# Patient Record
Sex: FEMALE | Race: WHITE | NOT HISPANIC OR LATINO | Employment: OTHER | URBAN - METROPOLITAN AREA
[De-identification: names, ages, dates, MRNs, and addresses within clinical notes are randomized per-mention and may not be internally consistent; named-entity substitution may affect disease eponyms.]

---

## 2017-01-30 ENCOUNTER — AMBULATORY - HEALTHEAST (OUTPATIENT)
Dept: HEALTH INFORMATION MANAGEMENT | Facility: CLINIC | Age: 62
End: 2017-01-30

## 2024-01-12 ENCOUNTER — TRANSFERRED RECORDS (OUTPATIENT)
Dept: HEALTH INFORMATION MANAGEMENT | Facility: CLINIC | Age: 69
End: 2024-01-12
Payer: COMMERCIAL

## 2024-01-31 ENCOUNTER — MEDICAL CORRESPONDENCE (OUTPATIENT)
Dept: HEALTH INFORMATION MANAGEMENT | Facility: CLINIC | Age: 69
End: 2024-01-31
Payer: COMMERCIAL

## 2024-01-31 ENCOUNTER — TRANSCRIBE ORDERS (OUTPATIENT)
Dept: OTHER | Age: 69
End: 2024-01-31

## 2024-01-31 DIAGNOSIS — Q43.3 MALROTATION COLON (H): Primary | ICD-10-CM

## 2024-02-02 NOTE — TELEPHONE ENCOUNTER
Diagnosis, Referred by & from: malrotation of Colon   Appt date: 2/14/2024   NOTES STATUS DETAILS   OFFICE NOTE from referring provider Received [facility]:  1/12/24 - CR OV with Dr. Min   OFFICE NOTE from other specialist Care Everywhere [facility]:  1/2/24 - PCC OV with GAGAN Waddell   DISCHARGE SUMMARY from hospital Care Everywhere [facility]:  12/19/23 - Admission with Dr. Watkins   DISCHARGE REPORT from the ER N/A    OPERATIVE REPORT Care Everywhere [facility]:  5/1/23 - OP Note for ROBOTIC ASSISTED BYPASS GASTRIC AIDEE-N-Y with Dr. Waite    [facility]:  1/2/13 - OP Note for HYSTEROSCOPY with Dr. Dejesus   MEDICATION LIST Received    LABS N/A    DIAGNOSTIC PROCEDURES     COLONOSCOPY (most recent all time after 5 years) Care Everywhere [facility]:  2/13/20 - Colonoscopy   IMAGING (DISC & REPORT)      CT Received [facility]:  12/19/23 - CT Abd/pelvis   XRAY Received [facility]:  12/21/23 - XR Colon   ULTRASOUND  (ENDOANAL/ENDORECTAL) Received [facility]:  12/21/23 - US Abdomen  10/21/22 - US Abdomen     Records Requested  02/02/24    Facility  [facility]  Fax: [phone]  [facility]  Fax: [phone]   Outcome * 2/2/24 1:55 PM Faxed urgent request to [facility] and [facility] for images and records to be sent to the clinic. - Nelli    * 2/2/24 3:16 PM Records received from [facility] and sent to [facility] to be scanned into the chart. - Nelli    * 2/6/24 7:41 AM Images received from [facility] and attached to the patient in PACs. - Nelli

## 2024-02-07 NOTE — PROGRESS NOTES
Colon and Rectal Surgery Clinic Note    RE: Fany Bowman.  : 1955.  [place]RA[place]: 2024.    Reason for visit: malrotation of colon.    HPI: Fany Bowman is a 68 year old female who presents today for malrotation of colon. She has a past medical history of obesity s/p robotic joanna-en-y in May 2023, TANK on CPAP, hypercholesterolemia, type 2 diabetes (last A1C was 5.4 in 2023), and nonalcoholic fatty liver disease. During her joanna-en-y in May it was noted that her cecum was malrotated in the RUQ. Dr. Owusu did lyse adhesive bands between the colon and small bowel and performed an appendectomy. The jejunum was measured 75 cm distally and the loop of jejunum was brought up to the gastric pouch in antecolic, antegastric position. Fany was admitted to an OSH in December for abdominal pain and nausea. CT Abdomen/Pelvis on 2023 demonstrated the cecum in the LUQ anteriorly and mildly distended with gas and focal narrowing and acute angulation in the mid ascending colon. GGE on 2023 demonstrated a short segment focal narrowing in the ascending colon in the upper mid abdomen.  There was question of cecal bascule in the setting of congenital intestinal malrotation. Last colonoscopy was in  which was normal. Fany has previously had polyps.    She was seen by Dr. Min at [facility] and was referred to the [facility].     Today Fany feels well.  She has alternating constipation and diarrhea since her weight loss surgery.  She has lost 135 lbs.  Her constipation is improving off oral iron, but she will start IV iron supplementation soon.  She denies any fecal incontinence or urgency.     Colonoscopy (2020):  Findings:       Skin tags were found on perianal exam.        The digital rectal exam was normal. Pertinent negatives include        normal sphincter tone, no palpable rectal lesions and no anal lesion        or abnormality detected.        The terminal ileum appeared normal.        The  colon (entire examined portion) appeared normal.        The retroflexed view of the distal rectum and anal verge was normal        and showed no anal or rectal abnormalities.   Moderate Sedation:        Moderate (conscious) sedation was administered by the endoscopy nurse        and supervised by the endoscopist. The following parameters were        monitored: oxygen saturation, heart rate, blood pressure, and        response to care. Total physician intraservice time was 18 minutes.   Impression:          - Perianal skin tags found on perianal exam.                        - The examined portion of the ileum was normal.                        - The entire examined colon is normal.                        - No specimens collected.     CT Abdomen/Pelvis (12/19/2023):  1.  Several diminutive calcified stones in the mildly distended gallbladder.   2.  Normal variant congenital intestinal malrotation.   3.  The cecum is located in the left upper quadrant anteriorly, is mildly distended with gas and there is focal narrowing and acute angulation in the mid ascending colon all of which is of uncertain clinical significance.   4.  Appendectomy.   5.  Unremarkable Manan-en-Y gastric bypass     X Ray Water Colon Soluble (12/21/2023):  1.  Short segment focal narrowing in the ascending colon in the upper mid abdomen, as seen on prior CT. No apple core mass lesion was identified on prior CT. I was able to get contrast to pass through this area, and there is no complete obstruction. This may be a cecal bascule in the setting of congenital intestinal nonrotation/malrotation. The differential includes a high-grade stricture due to prior ischemia, but considered less likely given the twisting appearance on prior CT.     Medical history:  Morbid obesity   2. Obstructive sleep apnea   3. Hypercholesterolemia   4. Type 2 diabetes mellitus   5. NAFLD (nonalcoholic fatty liver disease)     Surgical history:  Total right knee arthroplasty    Manan-en-y with hiatal hernia repair and incidental appendectomy     Family history:  No family history on file.  No IBD or GI malignancy    Medications:  Current Outpatient Medications   Medication Sig Dispense Refill    atorvastatin (LIPITOR) 10 MG tablet [ATORVASTATIN (LIPITOR) 10 MG TABLET] Take 10 mg by mouth bedtime.      glucosamine-chondroitin 500-400 mg cap [GLUCOSAMINE-CHONDROITIN 500-400 MG CAP] Take 2 capsules by mouth daily.      PARoxetine (PAXIL) 20 MG tablet [PAROXETINE (PAXIL) 20 MG TABLET] Take 20 mg by mouth bedtime.          Allergies:  No Known Allergies    Social history:   Social History     Tobacco Use    Smoking status: Never    Smokeless tobacco: Not on file   Substance Use Topics    Alcohol use: Yes     Comment: Alcoholic Drinks/day: 1 wk     Marital status: .    ROS:  A complete review of systems was performed with the patient and all systems negative except as per HPI.    Physical Examination:  Exam was chaperoned by GAGAN Bai   There were no vitals taken for this visit.  General: Well hydrated. No acute distress.  CV: RRR  Lung: Non-labored breathing on RA  Abdomen: Soft, NT, nondistended. No hernias    ASSESSMENT/PLAN:    This is a 68 year old with partial congenital malrotation s/p bands lysed at time of gastric bypass, now with episode of cecal bascule.  We reviewed the roles of right colectomy and cecopexy.  If possible, I think the latter would be a good option to prevent any worsening of diarrhea.  We discussed this could have a higher rate of reoccurrence.  We reviewed also what a right colectomy would involve in terms of procedure, recovery, risks and benefits, including a 2-3% risk of anastomotic leak and how that would be handled.    Recommending Robotic cecopexy vs right colectomy.    Risks, benefits, and alternatives of operative treatment were thoroughly discussed with the patient, she understands these well and agrees to proceed.    All pertinent labs  and imaging were personally reviewed by me.    30 minutes spent on the date of the encounter doing chart review, history and exam, imaging review, documentation and further activities as noted above.    The Division of Colon and Rectal Surgery at [facility] is committed to advancing discovery and innovation in human health through research. Fany LEONILA Bowman is willing to be contacted by our research team if they qualify for any future research studies:  N/A    Nelli Rubin MD    Division of Colon and Rectal Surgery  [facility]    Referring Provider:  No referring provider defined for this encounter.     Primary Care Provider:  No Ref-Primary, Physician

## 2024-02-14 ENCOUNTER — OFFICE VISIT (OUTPATIENT)
Dept: SURGERY | Facility: CLINIC | Age: 69
End: 2024-02-14
Payer: COMMERCIAL

## 2024-02-14 ENCOUNTER — PRE VISIT (OUTPATIENT)
Dept: SURGERY | Facility: CLINIC | Age: 69
End: 2024-02-14

## 2024-02-14 ENCOUNTER — TELEPHONE (OUTPATIENT)
Dept: SURGERY | Facility: CLINIC | Age: 69
End: 2024-02-14

## 2024-02-14 VITALS
DIASTOLIC BLOOD PRESSURE: 70 MMHG | WEIGHT: 155.1 LBS | HEART RATE: 66 BPM | OXYGEN SATURATION: 98 % | HEIGHT: 67 IN | BODY MASS INDEX: 24.34 KG/M2 | SYSTOLIC BLOOD PRESSURE: 110 MMHG

## 2024-02-14 DIAGNOSIS — K56.2 CECAL BASCULE (H): Primary | ICD-10-CM

## 2024-02-14 DIAGNOSIS — Q43.3 MALROTATION OF COLON (H): ICD-10-CM

## 2024-02-14 PROCEDURE — 99203 OFFICE O/P NEW LOW 30 MIN: CPT | Performed by: COLON & RECTAL SURGERY

## 2024-02-14 RX ORDER — MAGNESIUM CARB/ALUMINUM HYDROX 105-160MG
TABLET,CHEWABLE ORAL
Qty: 296 ML | Refills: 0 | Status: ON HOLD | OUTPATIENT
Start: 2024-02-14 | End: 2024-02-19

## 2024-02-14 RX ORDER — ACETAMINOPHEN 500 MG
1000 TABLET ORAL
Status: ON HOLD | COMMUNITY
End: 2024-02-21

## 2024-02-14 RX ORDER — FERROUS SULFATE 325(65) MG
325 TABLET ORAL
COMMUNITY

## 2024-02-14 RX ORDER — DOCUSATE SODIUM 100 MG/1
100 CAPSULE, LIQUID FILLED ORAL 2 TIMES DAILY
Status: ON HOLD | COMMUNITY
End: 2024-02-21

## 2024-02-14 RX ORDER — NEOMYCIN SULFATE 500 MG/1
1000 TABLET ORAL EVERY 6 HOURS
Qty: 6 TABLET | Refills: 0 | Status: ON HOLD | OUTPATIENT
Start: 2024-02-14 | End: 2024-02-19

## 2024-02-14 RX ORDER — METRONIDAZOLE 500 MG/1
500 TABLET ORAL EVERY 6 HOURS
Qty: 3 TABLET | Refills: 0 | Status: ON HOLD | OUTPATIENT
Start: 2024-02-14 | End: 2024-02-19

## 2024-02-14 RX ORDER — NEOMYCIN SULFATE, POLYMYXIN B SULFATE AND DEXAMETHASONE 3.5; 10000; 1 MG/ML; [USP'U]/ML; MG/ML
1-2 SUSPENSION/ DROPS OPHTHALMIC EVERY 4 HOURS
COMMUNITY

## 2024-02-14 RX ORDER — ONDANSETRON 4 MG/1
4 TABLET, FILM COATED ORAL EVERY 6 HOURS
Qty: 3 TABLET | Refills: 0 | Status: ON HOLD | OUTPATIENT
Start: 2024-02-14 | End: 2024-02-21

## 2024-02-14 RX ORDER — POLYETHYLENE GLYCOL 3350 17 G/17G
POWDER, FOR SOLUTION ORAL
Qty: 238 G | Refills: 0 | Status: ON HOLD | OUTPATIENT
Start: 2024-02-14 | End: 2024-02-21

## 2024-02-14 ASSESSMENT — PAIN SCALES - GENERAL: PAINLEVEL: NO PAIN (0)

## 2024-02-14 NOTE — NURSING NOTE
"Chief Complaint   Patient presents with    Consult     Malrotation of colon.       Vitals:    02/14/24 0842   BP: 110/70   BP Location: Left arm   Patient Position: Sitting   Cuff Size: Adult Regular   Pulse: 66   SpO2: 98%   Weight: 70.4 kg (155 lb 1.6 oz)   Height: 1.702 m (5' 7\")       Body mass index is 24.29 kg/m .  Daniel Bush, EMT    "

## 2024-02-14 NOTE — TELEPHONE ENCOUNTER
Vm msg received from patient wanting a call back to schedule surgery with Dr. Rubin.    Forwarded patient's request to Dr. Rubin's  Supervisor Lucie Yeager to follow up with patient.    Laurita Godinez  [place]-op Coordinator  [facility]  Direct Phone: [phone]

## 2024-02-14 NOTE — LETTER
2024       RE: Fany Bowman  [address] [facility] [facility] [address]     Dear Colleague,    Thank you for referring your patient, Fany Bowman, to the [facility] COLON AND RECTAL [facility]GERY [facility] [place] [facility]. Please see a copy of my visit note below.    Colon and Rectal Surgery Clinic Note    RE: Fany Bowman.  : 1955.  [place]RA[place]: 2024.    Reason for visit: malrotation of colon.    HPI: Fany Bowman is a 68 year old female who presents today for malrotation of colon. She has a past medical history of obesity s/p robotic joanna-en-y in May 2023, TANK on CPAP, hypercholesterolemia, type 2 diabetes (last A1C was 5.4 in 2023), and nonalcoholic fatty liver disease. During her joanna-en-y in May it was noted that her cecum was malrotated in the RUQ. Dr. Owusu did lyse adhesive bands between the colon and small bowel and performed an appendectomy. The jejunum was measured 75 cm distally and the loop of jejunum was brought up to the gastric pouch in antecolic, antegastric position. Fany was admitted to an OSH in December for abdominal pain and nausea. CT Abdomen/Pelvis on 2023 demonstrated the cecum in the LUQ anteriorly and mildly distended with gas and focal narrowing and acute angulation in the mid ascending colon. GGE on 2023 demonstrated a short segment focal narrowing in the ascending colon in the upper mid abdomen.  There was question of cecal bascule in the setting of congenital intestinal malrotation. Last colonoscopy was in  which was normal. Fany has previously had polyps.    She was seen by Dr. Min at [facility] and was referred to the [facility].     Today Fany feels well.  She has alternating constipation and diarrhea since her weight loss surgery.  She has lost 135 lbs.  Her constipation is improving off oral iron, but she will start IV iron supplementation soon.  She denies any fecal incontinence or urgency.      Colonoscopy (2/13/2020):  Findings:       Skin tags were found on perianal exam.        The digital rectal exam was normal. Pertinent negatives include        normal sphincter tone, no palpable rectal lesions and no anal lesion        or abnormality detected.        The terminal ileum appeared normal.        The colon (entire examined portion) appeared normal.        The retroflexed view of the distal rectum and anal verge was normal        and showed no anal or rectal abnormalities.   Moderate Sedation:        Moderate (conscious) sedation was administered by the endoscopy nurse        and supervised by the endoscopist. The following parameters were        monitored: oxygen saturation, heart rate, blood pressure, and        response to care. Total physician intraservice time was 18 minutes.   Impression:          - Perianal skin tags found on perianal exam.                        - The examined portion of the ileum was normal.                        - The entire examined colon is normal.                        - No specimens collected.     CT Abdomen/Pelvis (12/19/2023):  1.  Several diminutive calcified stones in the mildly distended gallbladder.   2.  Normal variant congenital intestinal malrotation.   3.  The cecum is located in the left upper quadrant anteriorly, is mildly distended with gas and there is focal narrowing and acute angulation in the mid ascending colon all of which is of uncertain clinical significance.   4.  Appendectomy.   5.  Unremarkable Manan-en-Y gastric bypass     X Ray Water Colon Soluble (12/21/2023):  1.  Short segment focal narrowing in the ascending colon in the upper mid abdomen, as seen on prior CT. No apple core mass lesion was identified on prior CT. I was able to get contrast to pass through this area, and there is no complete obstruction. This may be a cecal bascule in the setting of congenital intestinal nonrotation/malrotation. The differential includes a high-grade  stricture due to prior ischemia, but considered less likely given the twisting appearance on prior CT.     Medical history:  Morbid obesity   2. Obstructive sleep apnea   3. Hypercholesterolemia   4. Type 2 diabetes mellitus   5. NAFLD (nonalcoholic fatty liver disease)     Surgical history:  Total right knee arthroplasty   Manan-en-y with hiatal hernia repair and incidental appendectomy     Family history:  No family history on file.  No IBD or GI malignancy    Medications:  Current Outpatient Medications   Medication Sig Dispense Refill    atorvastatin (LIPITOR) 10 MG tablet [ATORVASTATIN (LIPITOR) 10 MG TABLET] Take 10 mg by mouth bedtime.      glucosamine-chondroitin 500-400 mg cap [GLUCOSAMINE-CHONDROITIN 500-400 MG CAP] Take 2 capsules by mouth daily.      PARoxetine (PAXIL) 20 MG tablet [PAROXETINE (PAXIL) 20 MG TABLET] Take 20 mg by mouth bedtime.          Allergies:  No Known Allergies    Social history:   Social History     Tobacco Use    Smoking status: Never    Smokeless tobacco: Not on file   Substance Use Topics    Alcohol use: Yes     Comment: Alcoholic Drinks/day: 1 wk     Marital status: .    ROS:  A complete review of systems was performed with the patient and all systems negative except as per HPI.    Physical Examination:  Exam was chaperoned by GAGAN Bai   There were no vitals taken for this visit.  General: Well hydrated. No acute distress.  CV: RRR  Lung: Non-labored breathing on RA  Abdomen: Soft, NT, nondistended. No hernias    ASSESSMENT/PLAN:    This is a 68 year old with partial congenital malrotation s/p bands lysed at time of gastric bypass, now with episode of cecal bascule.  We reviewed the roles of right colectomy and cecopexy.  If possible, I think the latter would be a good option to prevent any worsening of diarrhea.  We discussed this could have a higher rate of reoccurrence.  We reviewed also what a right colectomy would involve in terms of procedure,  recovery, risks and benefits, including a 2-3% risk of anastomotic leak and how that would be handled.    Recommending Robotic cecopexy vs right colectomy.    Risks, benefits, and alternatives of operative treatment were thoroughly discussed with the patient, she understands these well and agrees to proceed.    All pertinent labs and imaging were personally reviewed by me.    30 minutes spent on the date of the encounter doing chart review, history and exam, imaging review, documentation and further activities as noted above.    The Division of Colon and Rectal Surgery at [facility] is committed to advancing discovery and innovation in human health through research. Fany LEONILA Bowman is willing to be contacted by our research team if they qualify for any future research studies:  N/A      Referring Provider:  No referring provider defined for this encounter.     Primary Care Provider:  No Ref-Primary, Physician      Again, thank you for allowing me to participate in the care of your patient.      Sincerely,    Nelli Rubin MD

## 2024-02-15 ENCOUNTER — HOSPITAL ENCOUNTER (INPATIENT)
Facility: CLINIC | Age: 69
Setting detail: SURGERY ADMIT
End: 2024-02-15
Attending: COLON & RECTAL SURGERY | Admitting: COLON & RECTAL SURGERY
Payer: COMMERCIAL

## 2024-02-15 NOTE — TELEPHONE ENCOUNTER
Called patient to schedule surgery with Dr. Rubin.   Offered patient next available of 3/18/2024.   Patient agreed to surgical date.     Location of surgery: [place] OR  Pre-Op H&P: PAC in person scheduled 3/5/2024 with labs to follow     Post-Op Appt Date: 2-3 weeks with PA and surgeon 3-4 after. Patient aware this will be mailed out to her with surgical packet     Patient aware that pre-op RN will call 2-3 days prior to surgery with arrival time and instructions --  Yes    Packet sent out: Yes + full prep sent in surgical packet 02/15/24    Additional Comments:All patients questions were answered and was instructed to review surgical packet and call back with any questions or concerns.       Lucie Yeager on 2/15/2024 at 2:39 PM

## 2024-02-16 NOTE — TELEPHONE ENCOUNTER
FUTURE VISIT INFORMATION      SURGERY INFORMATION:  Date: 3/18/24  Location:  or  Surgeon:  Nelli Rubin MD   Anesthesia Type:  general  Procedure: Robotic cecopexy versus right colectomy   Consult: ov 2/14/24    RECORDS REQUESTED FROM:       Primary Care Provider: Maribel Gonzalez MD  - [facility]    Pertinent Medical History: bradycardia

## 2024-02-18 ENCOUNTER — ANESTHESIA (OUTPATIENT)
Dept: SURGERY | Facility: CLINIC | Age: 69
DRG: 329 | End: 2024-02-18
Payer: COMMERCIAL

## 2024-02-18 ENCOUNTER — ANESTHESIA EVENT (OUTPATIENT)
Dept: SURGERY | Facility: CLINIC | Age: 69
DRG: 329 | End: 2024-02-18
Payer: COMMERCIAL

## 2024-02-18 ENCOUNTER — HOSPITAL ENCOUNTER (INPATIENT)
Facility: CLINIC | Age: 69
LOS: 3 days | Discharge: HOME | DRG: 329 | End: 2024-02-21
Attending: INTERNAL MEDICINE | Admitting: COLON & RECTAL SURGERY
Payer: COMMERCIAL

## 2024-02-18 ENCOUNTER — APPOINTMENT (OUTPATIENT)
Dept: CT IMAGING | Facility: CLINIC | Age: 69
DRG: 329 | End: 2024-02-18
Attending: INTERNAL MEDICINE
Payer: COMMERCIAL

## 2024-02-18 DIAGNOSIS — K55.9 ISCHEMIC BOWEL DISEASE (H): Primary | ICD-10-CM

## 2024-02-18 DIAGNOSIS — G89.18 ACUTE POST-OPERATIVE PAIN: ICD-10-CM

## 2024-02-18 DIAGNOSIS — K56.2 CECAL BASCULE (H): ICD-10-CM

## 2024-02-18 DIAGNOSIS — Q43.3: ICD-10-CM

## 2024-02-18 DIAGNOSIS — K56.2 CECAL VOLVULUS (H): ICD-10-CM

## 2024-02-18 DIAGNOSIS — Q43.3 MALROTATION OF COLON (H): ICD-10-CM

## 2024-02-18 LAB
ABO/RH(D): NORMAL
ALBUMIN SERPL BCG-MCNC: 4.4 G/DL (ref 3.5–5.2)
ALP SERPL-CCNC: 55 U/L (ref 40–150)
ALT SERPL W P-5'-P-CCNC: 21 U/L (ref 0–50)
ANION GAP SERPL CALCULATED.3IONS-SCNC: 27 MMOL/L (ref 7–15)
ANTIBODY SCREEN: NEGATIVE
AST SERPL W P-5'-P-CCNC: 32 U/L (ref 0–45)
ATRIAL RATE - MUSE: 45 BPM
BASE EXCESS BLDA CALC-SCNC: 0 MMOL/L (ref -3–3)
BASE EXCESS BLDA CALC-SCNC: 2.1 MMOL/L (ref -3–3)
BASOPHILS # BLD AUTO: 0.1 10E3/UL (ref 0–0.2)
BASOPHILS NFR BLD AUTO: 1 %
BILIRUB SERPL-MCNC: 0.6 MG/DL
BUN SERPL-MCNC: 14 MG/DL (ref 8–23)
CA-I BLD-MCNC: 4.4 MG/DL (ref 4.4–5.2)
CA-I BLD-MCNC: 5 MG/DL (ref 4.4–5.2)
CALCIUM SERPL-MCNC: 9.8 MG/DL (ref 8.8–10.2)
CHLORIDE SERPL-SCNC: 102 MMOL/L (ref 98–107)
CREAT SERPL-MCNC: 0.67 MG/DL (ref 0.51–0.95)
CREAT SERPL-MCNC: 0.8 MG/DL (ref 0.51–0.95)
CRP SERPL-MCNC: <3 MG/L
DEPRECATED HCO3 PLAS-SCNC: 15 MMOL/L (ref 22–29)
DIASTOLIC BLOOD PRESSURE - MUSE: NORMAL MMHG
EGFRCR SERPLBLD CKD-EPI 2021: 80 ML/MIN/1.73M2
EGFRCR SERPLBLD CKD-EPI 2021: >90 ML/MIN/1.73M2
EOSINOPHIL # BLD AUTO: 0.2 10E3/UL (ref 0–0.7)
EOSINOPHIL NFR BLD AUTO: 4 %
ERYTHROCYTE [DISTWIDTH] IN BLOOD BY AUTOMATED COUNT: 13.7 % (ref 10–15)
ERYTHROCYTE [SEDIMENTATION RATE] IN BLOOD BY WESTERGREN METHOD: 14 MM/HR (ref 0–30)
GLUCOSE BLD-MCNC: 166 MG/DL (ref 70–99)
GLUCOSE BLD-MCNC: 206 MG/DL (ref 70–99)
GLUCOSE BLDC GLUCOMTR-MCNC: 131 MG/DL (ref 70–99)
GLUCOSE SERPL-MCNC: 208 MG/DL (ref 70–99)
HCO3 BLDA-SCNC: 25 MMOL/L (ref 21–28)
HCO3 BLDA-SCNC: 26 MMOL/L (ref 21–28)
HCO3 BLDV-SCNC: 16 MMOL/L (ref 21–28)
HCT VFR BLD AUTO: 35 % (ref 35–47)
HGB BLD-MCNC: 11.3 G/DL (ref 11.7–15.7)
HGB BLD-MCNC: 11.5 G/DL (ref 11.7–15.7)
HGB BLD-MCNC: 11.8 G/DL (ref 11.7–15.7)
HOLD SPECIMEN: NORMAL
IMM GRANULOCYTES # BLD: 0 10E3/UL
IMM GRANULOCYTES NFR BLD: 0 %
INR PPP: 1.07 (ref 0.85–1.15)
INTERPRETATION ECG - MUSE: NORMAL
LACTATE BLD-SCNC: 0.7 MMOL/L (ref 0.7–2)
LACTATE BLD-SCNC: 1 MMOL/L (ref 0.7–2)
LACTATE BLD-SCNC: 7.4 MMOL/L
LACTATE SERPL-SCNC: 7.1 MMOL/L (ref 0.7–2)
LYMPHOCYTES # BLD AUTO: 2.9 10E3/UL (ref 0.8–5.3)
LYMPHOCYTES NFR BLD AUTO: 51 %
MCH RBC QN AUTO: 31.4 PG (ref 26.5–33)
MCHC RBC AUTO-ENTMCNC: 33.7 G/DL (ref 31.5–36.5)
MCV RBC AUTO: 93 FL (ref 78–100)
MONOCYTES # BLD AUTO: 0.5 10E3/UL (ref 0–1.3)
MONOCYTES NFR BLD AUTO: 8 %
NEUTROPHILS # BLD AUTO: 2 10E3/UL (ref 1.6–8.3)
NEUTROPHILS NFR BLD AUTO: 36 %
NRBC # BLD AUTO: 0 10E3/UL
NRBC BLD AUTO-RTO: 0 /100
O2/TOTAL GAS SETTING VFR VENT: 53 %
O2/TOTAL GAS SETTING VFR VENT: 53 %
OXYHGB MFR BLDA: 98 % (ref 92–100)
OXYHGB MFR BLDA: 98 % (ref 92–100)
P AXIS - MUSE: 44 DEGREES
PCO2 BLDA: 38 MM HG (ref 35–45)
PCO2 BLDA: 39 MM HG (ref 35–45)
PCO2 BLDV: 13 MM HG (ref 40–50)
PH BLDA: 7.41 [PH] (ref 7.35–7.45)
PH BLDA: 7.44 [PH] (ref 7.35–7.45)
PH BLDV: 7.7 [PH] (ref 7.32–7.43)
PLATELET # BLD AUTO: 276 10E3/UL (ref 150–450)
PO2 BLDA: 290 MM HG (ref 80–105)
PO2 BLDA: 290 MM HG (ref 80–105)
PO2 BLDV: 22 MM HG (ref 25–47)
POTASSIUM BLD-SCNC: 3.2 MMOL/L (ref 3.4–5.3)
POTASSIUM BLD-SCNC: 3.5 MMOL/L (ref 3.4–5.3)
POTASSIUM SERPL-SCNC: 3.2 MMOL/L (ref 3.4–5.3)
PR INTERVAL - MUSE: 108 MS
PROCALCITONIN SERPL IA-MCNC: 0.03 NG/ML
PROT SERPL-MCNC: 6.7 G/DL (ref 6.4–8.3)
QRS DURATION - MUSE: 84 MS
QT - MUSE: 564 MS
QTC - MUSE: 487 MS
R AXIS - MUSE: -32 DEGREES
RBC # BLD AUTO: 3.76 10E6/UL (ref 3.8–5.2)
SAO2 % BLDA: 98 % (ref 95–96)
SAO2 % BLDA: 98 % (ref 95–96)
SAO2 % BLDV: 61 % (ref 70–75)
SODIUM BLD-SCNC: 142 MMOL/L (ref 135–145)
SODIUM BLD-SCNC: 143 MMOL/L (ref 135–145)
SODIUM SERPL-SCNC: 144 MMOL/L (ref 135–145)
SPECIMEN EXPIRATION DATE: NORMAL
SYSTOLIC BLOOD PRESSURE - MUSE: NORMAL MMHG
T AXIS - MUSE: 37 DEGREES
VENTRICULAR RATE- MUSE: 45 BPM
WBC # BLD AUTO: 5.8 10E3/UL (ref 4–11)

## 2024-02-18 PROCEDURE — 36415 COLL VENOUS BLD VENIPUNCTURE: CPT | Performed by: INTERNAL MEDICINE

## 2024-02-18 PROCEDURE — 49000 EXPLORATION OF ABDOMEN: CPT

## 2024-02-18 PROCEDURE — 96374 THER/PROPH/DIAG INJ IV PUSH: CPT | Mod: 59 | Performed by: INTERNAL MEDICINE

## 2024-02-18 PROCEDURE — 84145 PROCALCITONIN (PCT): CPT | Performed by: INTERNAL MEDICINE

## 2024-02-18 PROCEDURE — 36415 COLL VENOUS BLD VENIPUNCTURE: CPT

## 2024-02-18 PROCEDURE — 250N000009 HC RX 250: Performed by: ANESTHESIOLOGY

## 2024-02-18 PROCEDURE — 999N000141 HC STATISTIC PRE-PROCEDURE NURSING ASSESSMENT: Performed by: COLON & RECTAL SURGERY

## 2024-02-18 PROCEDURE — 96361 HYDRATE IV INFUSION ADD-ON: CPT | Performed by: INTERNAL MEDICINE

## 2024-02-18 PROCEDURE — 85025 COMPLETE CBC W/AUTO DIFF WBC: CPT | Performed by: INTERNAL MEDICINE

## 2024-02-18 PROCEDURE — 96376 TX/PRO/DX INJ SAME DRUG ADON: CPT | Performed by: INTERNAL MEDICINE

## 2024-02-18 PROCEDURE — 83605 ASSAY OF LACTIC ACID: CPT

## 2024-02-18 PROCEDURE — 99140 ANES COMP EMERGENCY COND: CPT | Performed by: ANESTHESIOLOGY

## 2024-02-18 PROCEDURE — 258N000003 HC RX IP 258 OP 636: Performed by: INTERNAL MEDICINE

## 2024-02-18 PROCEDURE — 96375 TX/PRO/DX INJ NEW DRUG ADDON: CPT | Performed by: INTERNAL MEDICINE

## 2024-02-18 PROCEDURE — 85610 PROTHROMBIN TIME: CPT | Performed by: INTERNAL MEDICINE

## 2024-02-18 PROCEDURE — 88309 TISSUE EXAM BY PATHOLOGIST: CPT | Mod: TC | Performed by: COLON & RECTAL SURGERY

## 2024-02-18 PROCEDURE — 250N000013 HC RX MED GY IP 250 OP 250 PS 637

## 2024-02-18 PROCEDURE — 93005 ELECTROCARDIOGRAM TRACING: CPT | Performed by: INTERNAL MEDICINE

## 2024-02-18 PROCEDURE — 82040 ASSAY OF SERUM ALBUMIN: CPT | Performed by: INTERNAL MEDICINE

## 2024-02-18 PROCEDURE — 250N000011 HC RX IP 250 OP 636: Performed by: INTERNAL MEDICINE

## 2024-02-18 PROCEDURE — 5A09357 ASSISTANCE WITH RESPIRATORY VENTILATION, LESS THAN 24 CONSECUTIVE HOURS, CONTINUOUS POSITIVE AIRWAY PRESSURE: ICD-10-PCS | Performed by: COLON & RECTAL SURGERY

## 2024-02-18 PROCEDURE — 86140 C-REACTIVE PROTEIN: CPT | Performed by: INTERNAL MEDICINE

## 2024-02-18 PROCEDURE — 0DTF0ZZ RESECTION OF RIGHT LARGE INTESTINE, OPEN APPROACH: ICD-10-PCS | Performed by: COLON & RECTAL SURGERY

## 2024-02-18 PROCEDURE — 74177 CT ABD & PELVIS W/CONTRAST: CPT

## 2024-02-18 PROCEDURE — 250N000025 HC SEVOFLURANE, PER MIN: Performed by: COLON & RECTAL SURGERY

## 2024-02-18 PROCEDURE — 44160 REMOVAL OF COLON: CPT | Mod: 22 | Performed by: COLON & RECTAL SURGERY

## 2024-02-18 PROCEDURE — 82565 ASSAY OF CREATININE: CPT

## 2024-02-18 PROCEDURE — 120N000002 HC R&B MED SURG/OB UMMC

## 2024-02-18 PROCEDURE — 250N000011 HC RX IP 250 OP 636: Performed by: ANESTHESIOLOGY

## 2024-02-18 PROCEDURE — 99140 ANES COMP EMERGENCY COND: CPT

## 2024-02-18 PROCEDURE — 82805 BLOOD GASES W/O2 SATURATION: CPT

## 2024-02-18 PROCEDURE — 49000 EXPLORATION OF ABDOMEN: CPT | Performed by: ANESTHESIOLOGY

## 2024-02-18 PROCEDURE — 86900 BLOOD TYPING SEROLOGIC ABO: CPT | Performed by: INTERNAL MEDICINE

## 2024-02-18 PROCEDURE — 360N000077 HC SURGERY LEVEL 4, PER MIN: Performed by: COLON & RECTAL SURGERY

## 2024-02-18 PROCEDURE — 85652 RBC SED RATE AUTOMATED: CPT | Performed by: INTERNAL MEDICINE

## 2024-02-18 PROCEDURE — 258N000003 HC RX IP 258 OP 636: Performed by: ANESTHESIOLOGY

## 2024-02-18 PROCEDURE — 370N000017 HC ANESTHESIA TECHNICAL FEE, PER MIN: Performed by: COLON & RECTAL SURGERY

## 2024-02-18 PROCEDURE — 99285 EMERGENCY DEPT VISIT HI MDM: CPT | Mod: 25 | Performed by: INTERNAL MEDICINE

## 2024-02-18 PROCEDURE — 258N000003 HC RX IP 258 OP 636

## 2024-02-18 PROCEDURE — 93010 ELECTROCARDIOGRAM REPORT: CPT | Performed by: INTERNAL MEDICINE

## 2024-02-18 PROCEDURE — 710N000010 HC RECOVERY PHASE 1, LEVEL 2, PER MIN: Performed by: COLON & RECTAL SURGERY

## 2024-02-18 PROCEDURE — 250N000011 HC RX IP 250 OP 636: Mod: JZ

## 2024-02-18 PROCEDURE — 88309 TISSUE EXAM BY PATHOLOGIST: CPT | Mod: 26 | Performed by: PATHOLOGY

## 2024-02-18 PROCEDURE — 99291 CRITICAL CARE FIRST HOUR: CPT | Mod: 25 | Performed by: INTERNAL MEDICINE

## 2024-02-18 PROCEDURE — 272N000001 HC OR GENERAL SUPPLY STERILE: Performed by: COLON & RECTAL SURGERY

## 2024-02-18 PROCEDURE — 250N000011 HC RX IP 250 OP 636

## 2024-02-18 PROCEDURE — 0DQV0ZZ REPAIR MESENTERY, OPEN APPROACH: ICD-10-PCS | Performed by: COLON & RECTAL SURGERY

## 2024-02-18 PROCEDURE — 74177 CT ABD & PELVIS W/CONTRAST: CPT | Mod: 26 | Performed by: RADIOLOGY

## 2024-02-18 PROCEDURE — 82330 ASSAY OF CALCIUM: CPT

## 2024-02-18 PROCEDURE — 82803 BLOOD GASES ANY COMBINATION: CPT

## 2024-02-18 RX ORDER — CEFAZOLIN SODIUM/WATER 2 G/20 ML
2 SYRINGE (ML) INTRAVENOUS
Status: COMPLETED | OUTPATIENT
Start: 2024-02-18 | End: 2024-02-18

## 2024-02-18 RX ORDER — LIDOCAINE HYDROCHLORIDE 20 MG/ML
INJECTION, SOLUTION INFILTRATION; PERINEURAL PRN
Status: DISCONTINUED | OUTPATIENT
Start: 2024-02-18 | End: 2024-02-18

## 2024-02-18 RX ORDER — MAGNESIUM SULFATE HEPTAHYDRATE 40 MG/ML
2 INJECTION, SOLUTION INTRAVENOUS ONCE
Status: COMPLETED | OUTPATIENT
Start: 2024-02-18 | End: 2024-02-18

## 2024-02-18 RX ORDER — SODIUM CHLORIDE, SODIUM LACTATE, POTASSIUM CHLORIDE, CALCIUM CHLORIDE 600; 310; 30; 20 MG/100ML; MG/100ML; MG/100ML; MG/100ML
INJECTION, SOLUTION INTRAVENOUS CONTINUOUS PRN
Status: DISCONTINUED | OUTPATIENT
Start: 2024-02-18 | End: 2024-02-18

## 2024-02-18 RX ORDER — LIDOCAINE 40 MG/G
CREAM TOPICAL
Status: DISCONTINUED | OUTPATIENT
Start: 2024-02-18 | End: 2024-02-21 | Stop reason: HOSPADM

## 2024-02-18 RX ORDER — METRONIDAZOLE 500 MG/100ML
500 INJECTION, SOLUTION INTRAVENOUS
Status: COMPLETED | OUTPATIENT
Start: 2024-02-18 | End: 2024-02-18

## 2024-02-18 RX ORDER — DEXAMETHASONE SODIUM PHOSPHATE 4 MG/ML
INJECTION, SOLUTION INTRA-ARTICULAR; INTRALESIONAL; INTRAMUSCULAR; INTRAVENOUS; SOFT TISSUE PRN
Status: DISCONTINUED | OUTPATIENT
Start: 2024-02-18 | End: 2024-02-18

## 2024-02-18 RX ORDER — POTASSIUM CHLORIDE 7.45 MG/ML
10 INJECTION INTRAVENOUS ONCE
Status: DISCONTINUED | OUTPATIENT
Start: 2024-02-18 | End: 2024-02-18 | Stop reason: HOSPADM

## 2024-02-18 RX ORDER — DEXTROSE MONOHYDRATE 25 G/50ML
25-50 INJECTION, SOLUTION INTRAVENOUS
Status: DISCONTINUED | OUTPATIENT
Start: 2024-02-18 | End: 2024-02-18 | Stop reason: HOSPADM

## 2024-02-18 RX ORDER — HYDROMORPHONE HYDROCHLORIDE 1 MG/ML
0.5 INJECTION, SOLUTION INTRAMUSCULAR; INTRAVENOUS; SUBCUTANEOUS ONCE
Status: COMPLETED | OUTPATIENT
Start: 2024-02-18 | End: 2024-02-18

## 2024-02-18 RX ORDER — HALOPERIDOL 5 MG/ML
1 INJECTION INTRAMUSCULAR
Status: DISCONTINUED | OUTPATIENT
Start: 2024-02-18 | End: 2024-02-18 | Stop reason: HOSPADM

## 2024-02-18 RX ORDER — ACETAMINOPHEN 325 MG/1
975 TABLET ORAL EVERY 8 HOURS
Status: DISCONTINUED | OUTPATIENT
Start: 2024-02-18 | End: 2024-02-21 | Stop reason: HOSPADM

## 2024-02-18 RX ORDER — ONDANSETRON 4 MG/1
4 TABLET, ORALLY DISINTEGRATING ORAL EVERY 6 HOURS PRN
Status: DISCONTINUED | OUTPATIENT
Start: 2024-02-18 | End: 2024-02-21 | Stop reason: HOSPADM

## 2024-02-18 RX ORDER — ACETAMINOPHEN 325 MG/1
650 TABLET ORAL EVERY 4 HOURS PRN
Status: DISCONTINUED | OUTPATIENT
Start: 2024-02-21 | End: 2024-02-21 | Stop reason: HOSPADM

## 2024-02-18 RX ORDER — PROPOFOL 10 MG/ML
INJECTION, EMULSION INTRAVENOUS PRN
Status: DISCONTINUED | OUTPATIENT
Start: 2024-02-18 | End: 2024-02-18

## 2024-02-18 RX ORDER — NALOXONE HYDROCHLORIDE 0.4 MG/ML
0.2 INJECTION, SOLUTION INTRAMUSCULAR; INTRAVENOUS; SUBCUTANEOUS
Status: DISCONTINUED | OUTPATIENT
Start: 2024-02-18 | End: 2024-02-21 | Stop reason: HOSPADM

## 2024-02-18 RX ORDER — CALCIUM CHLORIDE 100 MG/ML
INJECTION INTRAVENOUS; INTRAVENTRICULAR PRN
Status: DISCONTINUED | OUTPATIENT
Start: 2024-02-18 | End: 2024-02-18

## 2024-02-18 RX ORDER — OXYCODONE HYDROCHLORIDE 10 MG/1
10 TABLET ORAL EVERY 4 HOURS PRN
Status: DISCONTINUED | OUTPATIENT
Start: 2024-02-18 | End: 2024-02-21 | Stop reason: HOSPADM

## 2024-02-18 RX ORDER — HYDRALAZINE HYDROCHLORIDE 20 MG/ML
2.5-5 INJECTION INTRAMUSCULAR; INTRAVENOUS EVERY 10 MIN PRN
Status: DISCONTINUED | OUTPATIENT
Start: 2024-02-18 | End: 2024-02-18 | Stop reason: HOSPADM

## 2024-02-18 RX ORDER — HYDROMORPHONE HCL IN WATER/PF 6 MG/30 ML
0.2 PATIENT CONTROLLED ANALGESIA SYRINGE INTRAVENOUS EVERY 5 MIN PRN
Status: DISCONTINUED | OUTPATIENT
Start: 2024-02-18 | End: 2024-02-18 | Stop reason: HOSPADM

## 2024-02-18 RX ORDER — ONDANSETRON 2 MG/ML
INJECTION INTRAMUSCULAR; INTRAVENOUS PRN
Status: DISCONTINUED | OUTPATIENT
Start: 2024-02-18 | End: 2024-02-18

## 2024-02-18 RX ORDER — OXYCODONE HYDROCHLORIDE 5 MG/1
5 TABLET ORAL EVERY 4 HOURS PRN
Status: DISCONTINUED | OUTPATIENT
Start: 2024-02-18 | End: 2024-02-21 | Stop reason: HOSPADM

## 2024-02-18 RX ORDER — SODIUM CHLORIDE, SODIUM LACTATE, POTASSIUM CHLORIDE, CALCIUM CHLORIDE 600; 310; 30; 20 MG/100ML; MG/100ML; MG/100ML; MG/100ML
INJECTION, SOLUTION INTRAVENOUS CONTINUOUS
Status: DISCONTINUED | OUTPATIENT
Start: 2024-02-18 | End: 2024-02-18

## 2024-02-18 RX ORDER — DEXTROSE MONOHYDRATE 100 MG/ML
INJECTION, SOLUTION INTRAVENOUS CONTINUOUS PRN
Status: DISCONTINUED | OUTPATIENT
Start: 2024-02-18 | End: 2024-02-18 | Stop reason: HOSPADM

## 2024-02-18 RX ORDER — ONDANSETRON 2 MG/ML
4 INJECTION INTRAMUSCULAR; INTRAVENOUS EVERY 6 HOURS PRN
Status: DISCONTINUED | OUTPATIENT
Start: 2024-02-18 | End: 2024-02-21 | Stop reason: HOSPADM

## 2024-02-18 RX ORDER — CEFAZOLIN SODIUM/WATER 2 G/20 ML
2 SYRINGE (ML) INTRAVENOUS SEE ADMIN INSTRUCTIONS
Status: DISCONTINUED | OUTPATIENT
Start: 2024-02-18 | End: 2024-02-18 | Stop reason: HOSPADM

## 2024-02-18 RX ORDER — HYDROMORPHONE HCL IN WATER/PF 6 MG/30 ML
0.2 PATIENT CONTROLLED ANALGESIA SYRINGE INTRAVENOUS
Status: DISCONTINUED | OUTPATIENT
Start: 2024-02-18 | End: 2024-02-21

## 2024-02-18 RX ORDER — NICOTINE POLACRILEX 4 MG
15-30 LOZENGE BUCCAL
Status: DISCONTINUED | OUTPATIENT
Start: 2024-02-18 | End: 2024-02-18 | Stop reason: HOSPADM

## 2024-02-18 RX ORDER — FENTANYL CITRATE 50 UG/ML
INJECTION, SOLUTION INTRAMUSCULAR; INTRAVENOUS PRN
Status: DISCONTINUED | OUTPATIENT
Start: 2024-02-18 | End: 2024-02-18

## 2024-02-18 RX ORDER — LABETALOL HYDROCHLORIDE 5 MG/ML
10 INJECTION, SOLUTION INTRAVENOUS
Status: DISCONTINUED | OUTPATIENT
Start: 2024-02-18 | End: 2024-02-18 | Stop reason: HOSPADM

## 2024-02-18 RX ORDER — HYDROMORPHONE HCL IN WATER/PF 6 MG/30 ML
0.4 PATIENT CONTROLLED ANALGESIA SYRINGE INTRAVENOUS EVERY 5 MIN PRN
Status: DISCONTINUED | OUTPATIENT
Start: 2024-02-18 | End: 2024-02-18 | Stop reason: HOSPADM

## 2024-02-18 RX ORDER — BUPIVACAINE HYDROCHLORIDE 2.5 MG/ML
INJECTION, SOLUTION EPIDURAL; INFILTRATION; INTRACAUDAL
Status: COMPLETED | OUTPATIENT
Start: 2024-02-18 | End: 2024-02-18

## 2024-02-18 RX ORDER — HYDROMORPHONE HYDROCHLORIDE 1 MG/ML
0.5 INJECTION, SOLUTION INTRAMUSCULAR; INTRAVENOUS; SUBCUTANEOUS
Status: DISCONTINUED | OUTPATIENT
Start: 2024-02-18 | End: 2024-02-21

## 2024-02-18 RX ORDER — SODIUM CHLORIDE, SODIUM LACTATE, POTASSIUM CHLORIDE, CALCIUM CHLORIDE 600; 310; 30; 20 MG/100ML; MG/100ML; MG/100ML; MG/100ML
INJECTION, SOLUTION INTRAVENOUS CONTINUOUS
Status: DISCONTINUED | OUTPATIENT
Start: 2024-02-18 | End: 2024-02-20

## 2024-02-18 RX ORDER — NALOXONE HYDROCHLORIDE 0.4 MG/ML
0.4 INJECTION, SOLUTION INTRAMUSCULAR; INTRAVENOUS; SUBCUTANEOUS
Status: DISCONTINUED | OUTPATIENT
Start: 2024-02-18 | End: 2024-02-21 | Stop reason: HOSPADM

## 2024-02-18 RX ORDER — IOPAMIDOL 755 MG/ML
95 INJECTION, SOLUTION INTRAVASCULAR ONCE
Status: COMPLETED | OUTPATIENT
Start: 2024-02-18 | End: 2024-02-18

## 2024-02-18 RX ORDER — DEXMEDETOMIDINE HYDROCHLORIDE 4 UG/ML
INJECTION, SOLUTION INTRAVENOUS
Status: COMPLETED | OUTPATIENT
Start: 2024-02-18 | End: 2024-02-18

## 2024-02-18 RX ORDER — ONDANSETRON 2 MG/ML
4 INJECTION INTRAMUSCULAR; INTRAVENOUS ONCE
Status: COMPLETED | OUTPATIENT
Start: 2024-02-18 | End: 2024-02-18

## 2024-02-18 RX ORDER — KETAMINE HCL IN 0.9 % NACL 20 MG/2 ML
5 SYRINGE (ML) INTRAVENOUS
Status: DISCONTINUED | OUTPATIENT
Start: 2024-02-18 | End: 2024-02-18

## 2024-02-18 RX ORDER — METHOCARBAMOL 500 MG/1
500 TABLET, FILM COATED ORAL 4 TIMES DAILY PRN
Status: DISCONTINUED | OUTPATIENT
Start: 2024-02-18 | End: 2024-02-21 | Stop reason: HOSPADM

## 2024-02-18 RX ORDER — ENOXAPARIN SODIUM 100 MG/ML
40 INJECTION SUBCUTANEOUS EVERY 24 HOURS
Status: DISCONTINUED | OUTPATIENT
Start: 2024-02-19 | End: 2024-02-21 | Stop reason: HOSPADM

## 2024-02-18 RX ORDER — DEXAMETHASONE SODIUM PHOSPHATE 10 MG/ML
INJECTION, SOLUTION INTRAMUSCULAR; INTRAVENOUS
Status: COMPLETED | OUTPATIENT
Start: 2024-02-18 | End: 2024-02-18

## 2024-02-18 RX ADMIN — DEXAMETHASONE SODIUM PHOSPHATE 2 MG: 10 INJECTION, SOLUTION INTRAMUSCULAR; INTRAVENOUS at 15:40

## 2024-02-18 RX ADMIN — HYDROMORPHONE HYDROCHLORIDE 0.4 MG: 0.2 INJECTION, SOLUTION INTRAMUSCULAR; INTRAVENOUS; SUBCUTANEOUS at 16:36

## 2024-02-18 RX ADMIN — Medication 20 MG: at 13:44

## 2024-02-18 RX ADMIN — HYDROMORPHONE HYDROCHLORIDE 0.4 MG: 0.2 INJECTION, SOLUTION INTRAMUSCULAR; INTRAVENOUS; SUBCUTANEOUS at 17:02

## 2024-02-18 RX ADMIN — ACETAMINOPHEN 975 MG: 325 TABLET, FILM COATED ORAL at 19:55

## 2024-02-18 RX ADMIN — MAGNESIUM SULFATE IN WATER 2 G: 40 INJECTION, SOLUTION INTRAVENOUS at 16:47

## 2024-02-18 RX ADMIN — SUGAMMADEX 25 MG: 100 INJECTION, SOLUTION INTRAVENOUS at 15:14

## 2024-02-18 RX ADMIN — HYDROMORPHONE HYDROCHLORIDE 0.5 MG: 1 INJECTION, SOLUTION INTRAMUSCULAR; INTRAVENOUS; SUBCUTANEOUS at 15:06

## 2024-02-18 RX ADMIN — ONDANSETRON 4 MG: 2 INJECTION INTRAMUSCULAR; INTRAVENOUS at 15:06

## 2024-02-18 RX ADMIN — Medication 20 MG: at 14:29

## 2024-02-18 RX ADMIN — INSULIN HUMAN 4 UNITS/HR: 1 INJECTION, SOLUTION INTRAVENOUS at 14:11

## 2024-02-18 RX ADMIN — Medication 30 MG: at 12:57

## 2024-02-18 RX ADMIN — SODIUM CHLORIDE 1000 ML: 9 INJECTION, SOLUTION INTRAVENOUS at 10:46

## 2024-02-18 RX ADMIN — HYDROMORPHONE HYDROCHLORIDE 0.5 MG: 1 INJECTION, SOLUTION INTRAMUSCULAR; INTRAVENOUS; SUBCUTANEOUS at 10:38

## 2024-02-18 RX ADMIN — OXYCODONE HYDROCHLORIDE 5 MG: 5 TABLET ORAL at 21:39

## 2024-02-18 RX ADMIN — SUCCINYLCHOLINE CHLORIDE 80 MG: 20 INJECTION, SOLUTION INTRAMUSCULAR; INTRAVENOUS; PARENTERAL at 12:26

## 2024-02-18 RX ADMIN — HYDROMORPHONE HYDROCHLORIDE 0.2 MG: 0.2 INJECTION, SOLUTION INTRAMUSCULAR; INTRAVENOUS; SUBCUTANEOUS at 19:55

## 2024-02-18 RX ADMIN — SODIUM CHLORIDE, POTASSIUM CHLORIDE, SODIUM LACTATE AND CALCIUM CHLORIDE: 600; 310; 30; 20 INJECTION, SOLUTION INTRAVENOUS at 16:46

## 2024-02-18 RX ADMIN — LIDOCAINE HYDROCHLORIDE 100 MG: 20 INJECTION, SOLUTION INFILTRATION; PERINEURAL at 12:26

## 2024-02-18 RX ADMIN — HYDROMORPHONE HYDROCHLORIDE 0.4 MG: 0.2 INJECTION, SOLUTION INTRAMUSCULAR; INTRAVENOUS; SUBCUTANEOUS at 17:16

## 2024-02-18 RX ADMIN — PROPOFOL 200 MG: 10 INJECTION, EMULSION INTRAVENOUS at 12:26

## 2024-02-18 RX ADMIN — IOPAMIDOL 95 ML: 755 INJECTION, SOLUTION INTRAVENOUS at 11:46

## 2024-02-18 RX ADMIN — METHOCARBAMOL 500 MG: 500 TABLET ORAL at 21:39

## 2024-02-18 RX ADMIN — SODIUM CHLORIDE, POTASSIUM CHLORIDE, SODIUM LACTATE AND CALCIUM CHLORIDE: 600; 310; 30; 20 INJECTION, SOLUTION INTRAVENOUS at 12:22

## 2024-02-18 RX ADMIN — FENTANYL CITRATE 100 MCG: 50 INJECTION INTRAMUSCULAR; INTRAVENOUS at 12:26

## 2024-02-18 RX ADMIN — ONDANSETRON 4 MG: 2 INJECTION INTRAMUSCULAR; INTRAVENOUS at 10:38

## 2024-02-18 RX ADMIN — Medication 20 MG: at 12:45

## 2024-02-18 RX ADMIN — SUGAMMADEX 175 MG: 100 INJECTION, SOLUTION INTRAVENOUS at 15:23

## 2024-02-18 RX ADMIN — PHENYLEPHRINE HYDROCHLORIDE 200 MCG: 10 INJECTION INTRAVENOUS at 13:02

## 2024-02-18 RX ADMIN — CALCIUM CHLORIDE INJECTION 1 G: 100 INJECTION, SOLUTION INTRAVENOUS at 14:09

## 2024-02-18 RX ADMIN — Medication 2 G: at 12:28

## 2024-02-18 RX ADMIN — DEXMEDETOMIDINE 40 MCG: 100 INJECTION, SOLUTION, CONCENTRATE INTRAVENOUS at 15:40

## 2024-02-18 RX ADMIN — PROPOFOL 50 MG: 10 INJECTION, EMULSION INTRAVENOUS at 15:20

## 2024-02-18 RX ADMIN — METRONIDAZOLE 500 MG: 500 INJECTION, SOLUTION INTRAVENOUS at 12:28

## 2024-02-18 RX ADMIN — BUPIVACAINE HYDROCHLORIDE 40 ML: 2.5 INJECTION, SOLUTION EPIDURAL; INFILTRATION; INTRACAUDAL; PERINEURAL at 15:39

## 2024-02-18 RX ADMIN — DEXAMETHASONE SODIUM PHOSPHATE 4 MG: 4 INJECTION, SOLUTION INTRA-ARTICULAR; INTRALESIONAL; INTRAMUSCULAR; INTRAVENOUS; SOFT TISSUE at 12:40

## 2024-02-18 RX ADMIN — BUPIVACAINE HYDROCHLORIDE AND EPINEPHRINE BITARTRATE 10 ML: 5; .005 INJECTION, SOLUTION EPIDURAL; INTRACAUDAL; PERINEURAL at 15:40

## 2024-02-18 RX ADMIN — HYDROMORPHONE HYDROCHLORIDE 0.5 MG: 1 INJECTION, SOLUTION INTRAMUSCULAR; INTRAVENOUS; SUBCUTANEOUS at 11:23

## 2024-02-18 ASSESSMENT — ACTIVITIES OF DAILY LIVING (ADL)
ADLS_ACUITY_SCORE: 29
DIFFICULTY_EATING/SWALLOWING: NO
DOING_ERRANDS_INDEPENDENTLY_DIFFICULTY: YES
ADLS_ACUITY_SCORE: 35
ADLS_ACUITY_SCORE: 35
CHANGE_IN_FUNCTIONAL_STATUS_SINCE_ONSET_OF_CURRENT_ILLNESS/INJURY: NO
DRESSING/BATHING_DIFFICULTY: NO
CONCENTRATING,_REMEMBERING_OR_MAKING_DECISIONS_DIFFICULTY: NO
ADLS_ACUITY_SCORE: 35
WALKING_OR_CLIMBING_STAIRS_DIFFICULTY: NO
WEAR_GLASSES_OR_BLIND: YES
HEARING_DIFFICULTY_OR_DEAF: NO
TOILETING_ISSUES: NO
ADLS_ACUITY_SCORE: 35
DIFFICULTY_COMMUNICATING: NO
ADLS_ACUITY_SCORE: 22
ADLS_ACUITY_SCORE: 35
FALL_HISTORY_WITHIN_LAST_SIX_MONTHS: NO

## 2024-02-18 ASSESSMENT — ENCOUNTER SYMPTOMS: SEIZURES: 0

## 2024-02-18 NOTE — ED NOTES
Brought pt to CT and then to PACU for emergent surgery. Gave PACU RN handoff report. Escorted pt's spouse to PACU.    MAREN MonN  Shift: [id number] - [id number]

## 2024-02-18 NOTE — ANESTHESIA PROCEDURE NOTES
TAP Procedure Note    Pre-Procedure   Staff -        Anesthesiologist:  Alexia Hobson MD       Resident/Fellow: Lucio Harris MD       Performed By: resident       Location: OR       Pre-Anesthestic Checklist: patient identified, IV checked, site marked, risks and benefits discussed, informed consent, monitors and equipment checked, pre-op evaluation, at physician/surgeon's request and post-op pain management  Timeout:       Correct Patient: Yes        Correct Procedure: Yes        Correct Site: Yes        Correct Position: Yes        Correct Laterality: Yes        Site Marked: Yes  Procedure Documentation  Procedure: TAP       Diagnosis: POST OPERATIVE PAIN       Laterality: bilateral       Patient Position: supine       Patient Prep/Sterile Barriers: sterile gloves, mask       Skin prep: Chloraprep       Needle Type: short bevel       Needle Gauge: 21.        Needle Length (millimeters): 110        Ultrasound guided       1. Ultrasound was used to identify targeted nerve, plexus, vascular marker, or fascial plane and place a needle adjacent to it in real-time.       2. Ultrasound was used to visualize the spread of anesthetic in close proximity to the above referenced structure.       3. A permanent image is entered into the patient's record.    Assessment/Narrative         The placement was negative for: blood aspirated, painful injection and site bleeding       Paresthesias: No.       Bolus given via needle..        Secured via.        Insertion/Infusion Method: Single Shot       Complications: none       Injection made incrementally with aspirations every 5 mL.    Medication(s) Administered   Bupivacaine 0.25% PF (Infiltration) - Infiltration   40 mL - 2/18/2024 3:39:00 PM  Bupivacaine 0.5% w/ 1:400K Epi (Injection) - Injection   10 mL - 2/18/2024 3:40:00 PM  Dexmedetomidine 4 mcg/mL (Perineural) - Perineural   40 mcg - 2/18/2024 3:40:00 PM  Dexamethasone 10 mg/mL PF (Perineural) - Perineural   2 mg -  "2/18/2024 3:40:00 PM    FOR [facility] ONLY:   Pain Team Contact information: please page the Pain Team Via [facility]. Search \"Pain\". During daytime hours, please page the attending first. At night please page the resident first.      "

## 2024-02-18 NOTE — ANESTHESIA PROCEDURE NOTES
Airway       Patient location during procedure: OR       Procedure Start/Stop Times: 2/18/2024 12:27 PM  Staff -        Anesthesiologist:  Adilia Delgado MD       CR[facility]: Chucky Mcguire APRN CRNA       Performed By: CRNA  Consent for Airway        Urgency: elective  Indications and Patient Condition       Indications for airway management: paty-procedural       Induction type:RSI       Mask difficulty assessment: 0 - not attempted    Final Airway Details       Final airway type: endotracheal airway       Successful airway: ETT - single and Oral  Endotracheal Airway Details        ETT size (mm): 7.0       Cuffed: yes       Successful intubation technique: video laryngoscopy       VL Blade Size: Glidescope 3       Grade View of Cords: 1       Adjucts: stylet       Position: Left       Measured from: gums/teeth       Secured at (cm): 21       Bite block used: None    Post intubation assessment        Placement verified by: capnometry, equal breath sounds and chest rise        Number of attempts at approach: 1       Number of other approaches attempted: 0       Secured with: tape       Ease of procedure: easy       Dentition: Unchanged and Intact (intact/unchanged from prior condition)    Medication(s) Administered   Medication Administration Time: 2/18/2024 12:27 PM

## 2024-02-18 NOTE — ANESTHESIA CARE TRANSFER NOTE
Patient: Fany Bowman    Procedure: Procedure(s):  Laparotomy exploratory  Colectomy right       Diagnosis: Volvulus (H) [K56.2]  Diagnosis Additional Information: No value filed.    Anesthesia Type:   No value filed.     Note:    Oropharynx: oropharynx clear of all foreign objects and spontaneously breathing  Level of Consciousness: awake  Oxygen Supplementation: room air    Independent Airway: airway patency satisfactory and stable  Dentition: dentition unchanged  Vital Signs Stable: post-procedure vital signs reviewed and stable  Report to RN Given: handoff report given  Patient transferred to: PACU    Handoff Report: Identifed the Patient, Identified the Reponsible Provider, Reviewed the pertinent medical history, Discussed the surgical course, Reviewed Intra-OP anesthesia mangement and issues during anesthesia, Set expectations for post-procedure period and Allowed opportunity for questions and acknowledgement of understanding    Vitals:  Vitals Value Taken Time   /57    Temp 36    Pulse 63 02/18/24 1552   Resp 9 02/18/24 1552   SpO2 96 % 02/18/24 1552   Vitals shown include unfiled device data.    Electronically Signed By: LEONILA CHAVEZ CRNA  February 18, 2024  3:53 PM

## 2024-02-18 NOTE — OP NOTE
"[facility]  Operative Note    Pre-operative diagnosis: Acute abdomen, malrotation of intestine, previous gastric bypass and suspicion of cecal volvulus with ischemia.   Post-operative diagnosis Acute abdomen, malrotation of intestine, previous gastric bypass, cecal volvulus with ischemia and internal hernia from adhesive band and constricted Leija defect.   Procedure: Laparotomy exploratory, right hemicolectomy, adhesiolysis, closure of Leija defect, closure of ileocolic defect \"M-defect\", and closure of jejunojejunostomy defect.   Surgeon: Telma Kaminski MD   Assistants(s): Madai Pastor MD Colorectal Surgery fellow, Mireya Yang MD Surgery Resident   Anesthesia: General    Estimated blood loss: Less than 50 ml    Total IV fluids: (See anesthesia record)   Blood transfusion: No transfusion was given during surgery   Total urine output: (See anesthesia record)   Drains: None   Specimens: Terminal ileum and right colon   Implants: None     Findings:   Cecal volvulus with ischemia. Ischemia of terminal ileum with lead point from a band at the appendectomy site with internal hernia. Constrained colon along Leija defect with tight band.   Complications: None.   Condition: Stable     Please also add a Modifier 22: This case was extremely difficult due to the postoperative anatomy from previous gastric bypass, malrotation, cecal volvulus, and ischemia such that identification of appropriate anatomic configuration and resection planes was difficult.  This case was at least 50% more difficult and took 1.5 hours longer than typical for a similar case (approximately twice as long).    INDICATIONS: Fany Bowman is a 68 year old female with past medical history of obesity s/p robotic joanna-en-y in May 2023, TANK on CPAP, hypercholesterolemia, type 2 diabetes (last A1C was 5.4 in Nov 2023), and nonalcoholic fatty liver disease. During her joanna-en-y in May it " was noted that her cecum was malrotated in the right upper quadrant. The bypass was performed with an antecolic, antegastric configuration. She presented in December 2023 with abdominal pain and what appeared to be a cecal bascule which resolved. She was seen this past week in clinic by my partner, Nelli Rubin, and was scheduled for surgery electively. She presents with severe pain since 8am. We are taking her emergently (now just past noon) for laparotomy as it appears on imaging she has an acute cecal volvulus. The risks and benefits of surgery were thoroughly discussed with the patient and she agreed to proceed.    DESCRIPTION OF PROCEDURE:    Ms. Bomwan was brought to the operating room, placed supine on the operating room table, and positioned by Anesthesia. General endotracheal anesthesia was induced.The patient was prepped and draped sterilely.  We began the procedure with a timeout and made a midline incision. Once getting access to the abdominal cavity, it was clear that there was small volume ascites and also ischemic colon and small bowel. A large wound protector was placed. At first, I was concerned for possible midgut volvulus, but on further inspection, it was about a foot of small bowel. And this appeared to be both a cecal volvulus as well as a lead point of a band from the cecum to the right side of the retroperitoneum which was constricting the terminal ileum. We divided this. There was full wall thickness ischemia of a patch of the cecal wall and the terminal ileum and right colon were purple but on reduction, they were viable. The Leija defect was a lead point and was tight going to the more distal colon. This was difficult to full determine. We therefore proceeded with right colectomy, dividing the terminal ileum was a TIRSO blue load 75 and the colon distally was also divided near the low splenic flexure which would have more likely been the transverse colon. as there were minimal  "retroperitoneal attachments and this was partial malrotation. The mesentery was carefully divided with the ligature and the colon carefully brought proximally via the Leija defect. At this point, the anatomy was carefully examined and we determined that how to configure the new surgical anatomy after the resection and the need to create an anastomosis. We first closed the Leija defect with 3-0 Vicryl carefully. We then closed the jejunojejunostomy defect with 3-0 Vicryl. Following this, we created our ileocolic anastomosis dividing a small amount of additional mesentery to divide evenly, we then made two enterotomies in the distal and proximal limbs for the colon along the tinea and for the small bowel along the antimesenteric aspect. The anastomosis was formed using a single firing of the TIRSO blue load 75, which was hemostatic on inspection  followed by a single firing of the TA-90 blue load. The anastomosis was reinforced with a 3-0 Vicryl crotch stitch and sutures along the transverse staple line. After this, the anastomosis appeared highly satisfactory and was under no tension and healthy in appearance. Finally, we closed the defect behind the new anastomosis and anterior to the Manan-limb (which would have been the equivalent of a retrocolic Manan-limb) now with altered anatomy, the so-called \"M-defect\". This was done with care to ensure it was not too tight along the Manan limb yet accomplishing a sound closure.The fascia was closed with running #1 PDS suture. The skin was irrigated out and stapled closed  and dry sterile dressing applied. At the end the case, all instruments and sponge counts were correct x2. The patient was emerged from anesthesia and taken to postoperative anesthesia care unit in good condition. I was present throughout the entirety of the case described above.       NEHA BOYLE MD   Colon and Rectal Surgery Staff  [facility]          "

## 2024-02-18 NOTE — ANESTHESIA POSTPROCEDURE EVALUATION
Patient: Fany Bowman    Procedure: Procedure(s):  Laparotomy exploratory  Colectomy right       Anesthesia Type:  General    Note:  Disposition: Inpatient   Postop Pain Control: Uneventful            Sign Out: Well controlled pain   PONV: No   Neuro/Psych: Uneventful            Sign Out: Acceptable/Baseline neuro status   Airway/Respiratory: Uneventful            Sign Out: Acceptable/Baseline resp. status   CV/Hemodynamics: Uneventful            Sign Out: Acceptable CV status; No obvious hypovolemia; No obvious fluid overload   Other NRE: NONE   DID A NON-ROUTINE EVENT OCCUR? No           Last vitals:  Vitals Value Taken Time   /69 02/18/24 1700   Temp 36.4  C (97.5  F) 02/18/24 1630   Pulse 75 02/18/24 1708   Resp 19 02/18/24 1708   SpO2 100 % 02/18/24 1708   Vitals shown include unfiled device data.    Electronically Signed By: ORLY MARTELL MD  February 18, 2024  5:09 PM

## 2024-02-18 NOTE — OR NURSING
Unable to do mepilex or nozin due to emergent case.  Pt did void and wipe down of full body pre op.

## 2024-02-18 NOTE — LETTER
Transition Communication Hand-off for Care Transitions to Next Level of Care Provider    Name: Fany Bowman  : 1955  MRN #: [id number]  Primary Care Provider: Mariebl Gonzalez     Primary Clinic: [address] [place] [address]     Reason for Hospitalization:  Ischemic bowel disease (H24) [K55.9]  Malrotation cecum (H28) [Q43.3]  Admit Date/Time: 2024 10:18 AM  Discharge Date: 24  Payor Source: Payor: [employer]ETC[employer]Di / Plan: [employer] MEDICARE ADVANTAGE / Product Type: Medicare /     Readmission Assessment Measure (OPAL) Risk Score/category: 13%         Reason for Communication Hand-off Referral: Other Care Coordination    Discharge Plan: Follow-up outpatient   Concern for non-adherence with plan of care:   Y/N No  Discharge Needs Assessment:  Needs      Flowsheet Row Most Recent Value   Equipment Currently Used at Home none          Follow-up plan:    Future Appointments   Date Time Provider Department Center   2024  7:00 PM Lupe Monroy, PT [facility] SUSANA Sanders, 5A Oncology & 5C non BMT [facility]  P: [phone]  Pager: [phone]  F: [phone]  Weekend & [facility] Recognized Holidays Pager: [phone]  Weekend Coverage: 5A & 5C      AVS/Discharge Summary is the source of truth; this is a helpful guide for improved communication of patient story

## 2024-02-18 NOTE — ANESTHESIA PROCEDURE NOTES
Arterial Line Procedure Note    Pre-Procedure   Staff -        Anesthesiologist:  Adilia Delgado MD       Performed By: anesthesiologist       Location: OR       Pre-Anesthestic Checklist: patient identified, IV checked, risks and benefits discussed, informed consent, monitors and equipment checked, pre-op evaluation and at physician/surgeon's request  Timeout:       Correct Patient: Yes        Correct Procedure: Yes        Correct Site: Yes        Correct Position: Yes   Line Placement:   This line was placed Post Induction  Procedure   Procedure: arterial line       Laterality: left       Insertion Site: radial.  Sterile Prep        Standard elements of sterile barrier followed       Skin prep: Chloraprep  Insertion/Injection        Technique: ultrasound guided and Seldinger Technique        1. Ultrasound was used to evaluate the access site.       2. Artery evaluated via ultrasound for patency/adequacy.       3. Using real-time ultrasound the needle/catheter was observed entering the artery/vein.       Catheter Type/Size: 20 G, 1.75 in/4.5 cm quick cath (integral wire)  Narrative        Tegaderm dressing used.       Complications: None apparent,        Arterial waveform: Yes        IBP within 10% of NIBP: Yes

## 2024-02-18 NOTE — ED TRIAGE NOTES
Abd pain severely worsened this morning  C/o nausea/vomiting/diarrhea  Has malrotation of colon  Rates pain 10/10, labored breathing in triage d/t pain    Scheduled to have cecopexy or R colectomy mid march

## 2024-02-18 NOTE — ANESTHESIA PREPROCEDURE EVALUATION
Anesthesia Pre-Procedure Evaluation    Patient: Fany Bowman   MRN: [id number] : 1955        Procedure : Procedure(s):  Laparotomy exploratory  Colectomy right          History reviewed. No pertinent past medical history.   Past Surgical History:   Procedure Laterality Date    ARTHROSCOPY KNEE      OTHER SURGICAL HISTORY      hysteroscopy biopsy    OTHER SURGICAL HISTORY      IUD placement    RELEASE CARPAL TUNNEL      ZZC TOTAL KNEE ARTHROPLASTY Right 2016    Procedure: KNEE TOTAL ARTHROPLASTY, RIGHT;  Surgeon: Jairo Reyes MD;  Location: [facility] OR;  Service: Orthopedics      No Known Allergies   Social History     Tobacco Use    Smoking status: Never    Smokeless tobacco: Never   Substance Use Topics    Alcohol use: Yes     Comment: Alcoholic Drinks/day: 1 wk      Wt Readings from Last 1 Encounters:   24 70.4 kg (155 lb 1.6 oz)        Anesthesia Evaluation   Pt has had prior anesthetic. Type: General.    No history of anesthetic complications       ROS/MED HX  ENT/Pulmonary:     (+) sleep apnea,                                    (-) asthma   Neurologic:    (-) no seizures and no CVA   Cardiovascular:       METS/Exercise Tolerance:     Hematologic:     (+)      anemia,          Musculoskeletal:       GI/Hepatic: Comment: Hx congenital malrotation of the colon  Hx obesity s/p robotic Manan-en-Y with antecolic, antigastric Manan limb in May 2023  Presented to the ED with acute onset abdominal pain, lactic acidosis, and pain out of proportion to exam concerning for mesenteric and bowel ischemia      Renal/Genitourinary:       Endo:     (+)  type II DM,                    Psychiatric/Substance Use:       Infectious Disease:       Malignancy:       Other:            Physical Exam    Airway        Mallampati: II   TM distance: > 3 FB   Neck ROM: full   Mouth opening: > 3 cm    Respiratory Devices and Support         Dental       (+) Minor Abnormalities - some fillings, tiny  "chips      Cardiovascular          Rhythm and rate: regular and normal     Pulmonary           breath sounds clear to auscultation           OUTSIDE LABS:  CBC:   Lab Results   Component Value Date    WBC 5.8 02/18/2024    HGB 11.8 02/18/2024    HCT 35.0 02/18/2024     02/18/2024     BMP:   Lab Results   Component Value Date     02/18/2024    POTASSIUM 3.2 (L) 02/18/2024    CHLORIDE 102 02/18/2024    CO2 15 (L) 02/18/2024    BUN 14.0 02/18/2024    CR 0.80 02/18/2024     (H) 02/18/2024     COAGS:   Lab Results   Component Value Date    INR 1.07 02/18/2024     POC: No results found for: \"BGM\", \"HCG\", \"HCGS\"  HEPATIC:   Lab Results   Component Value Date    ALBUMIN 4.4 02/18/2024    PROTTOTAL 6.7 02/18/2024    ALT 21 02/18/2024    AST 32 02/18/2024    ALKPHOS 55 02/18/2024    BILITOTAL 0.6 02/18/2024     OTHER:   Lab Results   Component Value Date    LACT 7.1 (HH) 02/18/2024    HALLIE 9.8 02/18/2024    SED 14 02/18/2024       Anesthesia Plan    ASA Status:  4, emergent    NPO Status:  ELEVATED Aspiration Risk/Unknown    Anesthesia Type: General.     - Airway: ETT   Induction: Intravenous.   Maintenance: Balanced.   Techniques and Equipment:     - Airway: Video-Laryngoscope     - Lines/Monitors: 2nd IV, Arterial Line, BIS     Consents    Anesthesia Plan(s) and associated risks, benefits, and realistic alternatives discussed. Questions answered and patient/representative(s) expressed understanding.     - Discussed:     - Discussed with:  Patient, Spouse      - Extended Intubation/Ventilatory Support Discussed: Yes.      - Patient is DNR/DNI Status: No     Use of blood products discussed: Yes.     - Discussed with: Patient.     - Consented: consented to blood products     Postoperative Care    Pain management: IV analgesics, Oral pain medications, Multi-modal analgesia, Peripheral nerve block (Single Shot) (consented patient for TAP blocks; risks were explained including infection, bleeding and nerve " damage).   PONV prophylaxis: Ondansetron (or other 5HT-3), Dexamethasone or Solumedrol     Comments:               ORLY MARTELL MD    I have reviewed the pertinent notes and labs in the chart from the past 30 days and (re)examined the patient.  Any updates or changes from those notes are reflected in this note.    # Hypokalemia: Lowest K = 3.2 mmol/L in last 2 days, will replace as needed      # Anion Gap Metabolic Acidosis: Highest Anion Gap = 27 mmol/L in last 2 days, will monitor and treat as appropriate

## 2024-02-18 NOTE — H&P
[facility]    History and Physical - Colorectal Surgery Service       Date of Admission:  2/18/2024    Assessment & Plan: Surgery   Fany Bowman is a 68 year old female with congenital malrotation of the colon, history of obesity s/p robotic Manan-en-Y with antecolic, antigastric Manan limb in May 2023 who presented to the ED with acute onset abdominal pain, lactic acidosis, and pain out of proportion to exam concerning for mesenteric and bowel ischemia. Plan for emergent exploration and possible bowel resection.     - to CT then directly to pre op/OR emergently for exploration, possible bowel resection  - keep NPO  - receiving IVF bolus in ED  - admit to colorectal surgery post operatively    Gomez Catheter: Not present  Lines: None     Drains: None     Cardiac Monitoring: None  Code Status:  Full Code    Clinically Significant Risk Factors Present on Admission        # Hypokalemia: Lowest K = 3.2 mmol/L in last 2 days, will replace as needed      # Anion Gap Metabolic Acidosis: Highest Anion Gap = 27 mmol/L in last 2 days, will monitor and treat as appropriate                      Disposition Plan          The patient's care was discussed with the CRS fellow who discussed with staff, Dr. Kaminski.    Mireya Yang MD  [facility]  Non-urgent messages: Securely message with [employer] (more info)  Text page via [employer] Paging/Directory     ______________________________________________________________________    Chief Complaint   Abdominal Pain    History is obtained from the patient    History of Present Illness   Fany Bowman is a 68 year old female with a history notable for congenital malrotation of the colon, obesity and comorbidities including TANK, HLD, T2DM, NAFLD s/p robotic Manan-en-Y with antecolic, antigastric Manan limb in May 2023 who presented to the ED for acute abdominal pain. She describes acute onset vague,  generalized, constant, 8-10/10 abdominal pain that began 8 AM this morning.  She has had associated nausea and retching.  She reports passing a little bit of gas and had a nonbloody BM this morning.  She denies fevers.  She says this is similar to but worse than the abdominal pain she was admitted to [facility] with in December at which time she was found to have congenital intestinal malrotation with dilated cecum in the left upper quadrant.    She was seen by Dr. Pandya in clinic on 2/14/2024 with plans for robotic right colectomy versus cecopexy in March.  She is full code.      Past Medical History    Obesity  Nonalcoholic fatty liver disease  Hypercholesterolemia  Anxiety  Depression  TANK    Past Surgical History   Carpal tunnel release  Total knee arthroplasty  Robotic Manan-en-Y gastric bypass with antecolic, antigastric Manan limb, lysis of adhesive bands between colon/small bowel, hiatal hernia repair, incidental appendectomy on 5/1/2023    Prior to Admission Medications   Prior to Admission Medications   Prescriptions Last Dose Informant Patient Reported? Taking?   Ferrous Sulfate (IRON PO)   Yes No   Sig: Take by mouth.   Methylcobalamin 1000 MCG SUBL   Yes No   Sig: Place 1,000 mcg under the tongue   PARoxetine (PAXIL) 20 MG tablet   Yes No   Sig: [PAROXETINE (PAXIL) 20 MG TABLET] Take 20 mg by mouth bedtime.    acetaminophen (TYLENOL) 500 MG tablet   Yes No   Sig: Take 1,000 mg by mouth   atorvastatin (LIPITOR) 10 MG tablet   Yes No   Sig: [ATORVASTATIN (LIPITOR) 10 MG TABLET] Take 10 mg by mouth bedtime.   docusate sodium (COLACE) 100 MG capsule   Yes No   Sig: Take 100 mg by mouth 2 times daily   ferrous sulfate (FEROSUL) 325 (65 Fe) MG tablet   Yes No   Sig: Take 325 mg by mouth   glucosamine-chondroitin 500-400 mg cap   Yes No   Sig: [GLUCOSAMINE-CHONDROITIN 500-400 MG CAP] Take 2 capsules by mouth daily.   magnesium citrate 1.745 GM/30ML solution   No No   Sig: Drink 1 bottle at 8pm the night before  surgery   metroNIDAZOLE (FLAGYL) 500 MG tablet   No No   Sig: Take 1 tablet (500 mg) by mouth every 6 hours At 8:00 am, 2:00 pm, 8:00 pm the day prior to your surgery with neomycin and zofran.   neomycin (MYCIFRADIN) 500 MG tablet   No No   Sig: Take 2 tablets (1,000 mg) by mouth every 6 hours At 8:00 am, 2:00 pm, 8:00 pm the day prior to your surgery with flagyl and zofran.   neomycin-polymyxin-dexAMETHasone (MAXITROL) 3.5-59814-2.1 SUSP ophthalmic susp   Yes No   Si-2 drops every 4 hours   ondansetron (ZOFRAN) 4 MG tablet   No No   Sig: Take 1 tablet (4 mg) by mouth every 6 hours At 8:00 am, 2:00 pm, 8:00 pm the day prior to your surgery with neomycin and flagyl.   polyethylene glycol (MIRALAX) 17 GM/Dose powder   No No   Sig: Please take 238 grams mixed with 64 oz of Gatorade at 4pm the night before surgery      Facility-Administered Medications: None        Review of Systems    The 10 point Review of Systems is negative other than noted in the HPI or here.     Social History   I have reviewed this patient's social history and updated it with pertinent information if needed.  Social History     Tobacco Use    Smoking status: Never    Smokeless tobacco: Never   Substance Use Topics    Alcohol use: Yes     Comment: Alcoholic Drinks/day: 1 wk    Drug use: No       Family History     No pertinent family hx    Allergies   No Known Allergies     Physical Exam   Vital Signs: Temp: (!) 94.9  F (34.9  C) Temp src: Rectal BP: 118/55 Pulse: (!) 45   Resp: (!) 44 SpO2: 98 % O2 Device: Nasal cannula Oxygen Delivery: 2 LPM  Weight: 0 lbs 0 ozNo intake or output data in the 24 hours ending 24 1115  General: Alert, in acute distress due to pain, slightly cyanotic, diaphoretic  Resp: Tachypneic  CV: Bradycardic, regular rhythm  Abdomen: soft, distended, mild generalized tenderness without guarding, no peritonitis, well-healed surgical incisions  Extremities: cyanotic distal extremities      Data   CT pending    Lactate  7.1  WBC 5.8  Hemoglobin 11.8  INR 1.0

## 2024-02-18 NOTE — ED PROVIDER NOTES
ED Provider Note  [facility]      History     Chief Complaint   Patient presents with    Abdominal Pain    Nausea, Vomiting, & Diarrhea     The history is provided by the patient and medical records.     Fany Bowman is a 68 year old female with a history of Robotic assisted, Manan-en-y Gastric Bypass, Hiatal hernia repair, incidental appendectomy (5/1/23), congenital intestinal malrotation, DMII, HLD, anemia, and obesity presents to the emergency department for abdominal pain, nausea, vomiting, and diarrhea.    Patient states the pain started suddenly at approximately 8 AM this morning.  She has similar episode approximately 1 month ago.  Patient does not report any new symptoms. She has been dry heaving, has passed a little gas, and had a small bowel movement.      Past Medical History  History reviewed. No pertinent past medical history.  Past Surgical History:   Procedure Laterality Date    ARTHROSCOPY KNEE      OTHER SURGICAL HISTORY      hysteroscopy biopsy    OTHER SURGICAL HISTORY      IUD placement    RELEASE CARPAL TUNNEL      ZZC TOTAL KNEE ARTHROPLASTY Right 4/19/2016    Procedure: KNEE TOTAL ARTHROPLASTY, RIGHT;  Surgeon: Jairo Reyes MD;  Location: [facility];  Service: Orthopedics     No current outpatient medications on file.    No Known Allergies  Family History  History reviewed. No pertinent family history.  Social History   Social History     Tobacco Use    Smoking status: Never    Smokeless tobacco: Never   Substance Use Topics    Alcohol use: Yes     Comment: Alcoholic Drinks/day: 1 wk    Drug use: No         A medically appropriate review of systems was performed with pertinent positives and negatives noted in the HPI, and all other systems negative.    Physical Exam   BP: 118/55  Pulse: 60  Temp: (!) 94.9  F (34.9  C)  Resp: (!) 44  SpO2: 96 %  Physical Exam  Vitals and nursing note reviewed.   Constitutional:       General: She is not in acute distress.      Appearance: She is not diaphoretic.   HENT:      Head: Normocephalic and atraumatic.   Eyes:      Extraocular Movements: Extraocular movements intact.      Conjunctiva/sclera: Conjunctivae normal.   Cardiovascular:      Rate and Rhythm: Normal rate and regular rhythm.      Heart sounds: Normal heart sounds. No murmur heard.     No friction rub. No gallop.   Pulmonary:      Effort: Pulmonary effort is normal. No respiratory distress.      Breath sounds: Normal breath sounds. No stridor. No wheezing, rhonchi or rales.   Chest:      Chest wall: No tenderness.   Abdominal:      General: Abdomen is flat. Bowel sounds are normal. There is no distension.      Palpations: Abdomen is soft. There is no mass.      Tenderness: There is generalized abdominal tenderness. There is no right CVA tenderness, left CVA tenderness, guarding or rebound. Negative signs include Garcia's sign, Rovsing's sign, McBurney's sign, psoas sign and obturator sign.      Hernia: No hernia is present.       Musculoskeletal:         General: No tenderness. Normal range of motion.      Cervical back: Normal range of motion and neck supple.   Skin:     General: Skin is warm.      Findings: No rash.   Neurological:      General: No focal deficit present.      Cranial Nerves: No cranial nerve deficit.           ED Course, Procedures, & Data      Procedures         EKG Interpretation:      Interpreted by Dang Monet MD, MD  Time reviewed: on arrival  Symptoms at time of EKG: abd pain   Rhythm: sinus bradycardia  Rate: Bradycardia  Axis: Normal  Ectopy: none  Conduction: normal  ST Segments/ T Waves: No ST-T wave changes  Q Waves: none  Comparison to prior: Unchanged from 4/7/2023    Clinical Impression: no acute changes                       Results for orders placed or performed during the hospital encounter of 02/18/24   CT Abdomen Pelvis w Contrast     Status: None    Narrative    EXAMINATION: CT ABDOMEN PELVIS W CONTRAST  2/18/2024 12:08 PM       CLINICAL HISTORY: pain h/o malrotation    COMPARISON: CT 12/19/2023    PROCEDURE COMMENTS: CT of the abdomen was performed with 74ml Isovue  370 intravenous and oral contrast. Coronal and sagittal reformatted  images were obtained.    FINDINGS:  Lower thorax: No acute consolidations. Few scattered calcified  granulomas in right lung base    Abdomen and pelvis:  Liver: No mass. No intrahepatic biliary ductal dilation.    Biliary System: Normal gallbladder. No extrahepatic biliary ductal  dilation.    Pancreas: Unchanged 0.8 cm low attenuating focus in the uncinate  process, probably representing interdigitating fat versus sidebranch  IPMN (4/146). No pancreatic ductal dilation. Moderate diffuse fatty  atrophy.    Adrenal glands: No mass or nodules    Spleen: Normal.    Kidneys: No suspicious mass, obstructing calculus or hydronephrosis.  Bilateral parapelvic cysts. Focal cortical hypodensities, too small to  characterize and statistically favored to represent simple cysts.    Gastrointestinal tract: Redemonstrated congenital bowel malrotation.  There is increased distention of the cecum which is present in the  left upper quadrant, measuring up to 8.7 cm, with wall thickening and  edema. There is associated new swirling of the adjacent mesentery,  best appreciated on series 6, image 28. The cecal wall is  hypoenhancing. Inverted location of the IC junction compared to prior.  Postoperative changes of Manan-en-Y. Postoperative changes of the small  bowel in the right upper quadrant.     Mesentery/peritoneum/retroperitoneum: Hazy edematous appearance of the  central mesentery New small volume ascites. No free air.     Lymph nodes: No significant lymphadenopathy.    Vasculature: Patent major abdominal vasculature.    Pelvis: Urinary bladder is normal.    Osseous structures: No aggressive or acute osseous lesion.      Soft tissues: Mild anasarca.      Impression    IMPRESSION:     1. Interval increased distention of  the cecum, wall thickening/edema,  altered location of IC junction compared to prior and adjacent  mesenteric swirling is concerning for cecal volvulus/closed loop  obstruction. Hypoenhancement of the cecal wall is concerning for  associated ischemic changes.     2. New extensive mesenteric edema and small volume ascites.      3. Similar appearance of congenital intestinal malrotation and  postoperative changes of Manan-en-Y.        Patient is already scheduled for an exploratory laparotomy at the time  of scan review.      I have personally reviewed the examination and initial interpretation  and I agree with the findings.    ANAND COMBS MD         SYSTEM ID:  [id number]   [place] Draw     Status: None    Narrative    The following orders were created for panel order [place] Draw.  Procedure                               Abnormality         Status                     ---------                               -----------         ------                     Extra Blue Top Tube[[id number]]                              Final result               Extra Red Top Tube[[id number]]                               Final result               Extra Green Top (Lithium...[[id number]]                      Final result               Extra Purple Top Tube[[id number]]                            Final result               Extra Blood Bank Purple ...[[id number]]                      Final result               Extra Green Top (Lithium...[[id number]]                      Final result                 Please view results for these tests on the individual orders.   Extra Blue Top Tube     Status: None   Result Value Ref Range    Hold Specimen [facility]    Extra Red Top Tube     Status: None   Result Value Ref Range    Hold Specimen [facility]    Extra Green Top (Lithium Heparin) Tube     Status: None   Result Value Ref Range    Hold Specimen [facility]    Extra Purple Top Tube     Status: None   Result Value Ref Range    Hold Specimen [facility]    Extra Blood Bank Purple Top  Tube     Status: None   Result Value Ref Range    Hold Specimen [facility]    Extra Green Top (Lithium Heparin) ON ICE     Status: None   Result Value Ref Range    Hold Specimen [facility]    Comprehensive metabolic panel     Status: Abnormal   Result Value Ref Range    Sodium 144 135 - 145 mmol/L    Potassium 3.2 (L) 3.4 - 5.3 mmol/L    Carbon Dioxide (CO2) 15 (L) 22 - 29 mmol/L    Anion Gap 27 (H) 7 - 15 mmol/L    Urea Nitrogen 14.0 8.0 - 23.0 mg/dL    Creatinine 0.80 0.51 - 0.95 mg/dL    GFR Estimate 80 >60 mL/min/1.73m2    Calcium 9.8 8.8 - 10.2 mg/dL    Chloride 102 98 - 107 mmol/L    Glucose 208 (H) 70 - 99 mg/dL    Alkaline Phosphatase 55 40 - 150 U/L    AST 32 0 - 45 U/L    ALT 21 0 - 50 U/L    Protein Total 6.7 6.4 - 8.3 g/dL    Albumin 4.4 3.5 - 5.2 g/dL    Bilirubin Total 0.6 <=1.2 mg/dL   CRP inflammation     Status: Normal   Result Value Ref Range    CRP Inflammation <3.00 <5.00 mg/L   Erythrocyte sedimentation rate auto     Status: Normal   Result Value Ref Range    Erythrocyte Sedimentation Rate 14 0 - 30 mm/hr   INR     Status: Normal   Result Value Ref Range    INR 1.07 0.85 - 1.15   CBC with platelets and differential     Status: Abnormal   Result Value Ref Range    WBC Count 5.8 4.0 - 11.0 10e3/uL    RBC Count 3.76 (L) 3.80 - 5.20 10e6/uL    Hemoglobin 11.8 11.7 - 15.7 g/dL    Hematocrit 35.0 35.0 - 47.0 %    MCV 93 78 - 100 fL    MCH 31.4 26.5 - 33.0 pg    MCHC 33.7 31.5 - 36.5 g/dL    RDW 13.7 10.0 - 15.0 %    Platelet Count 276 150 - 450 10e3/uL    % Neutrophils 36 %    % Lymphocytes 51 %    % Monocytes 8 %    % Eosinophils 4 %    % Basophils 1 %    % Immature Granulocytes 0 %    NRBCs per 100 WBC 0 <1 /100    Absolute Neutrophils 2.0 1.6 - 8.3 10e3/uL    Absolute Lymphocytes 2.9 0.8 - 5.3 10e3/uL    Absolute Monocytes 0.5 0.0 - 1.3 10e3/uL    Absolute Eosinophils 0.2 0.0 - 0.7 10e3/uL    Absolute Basophils 0.1 0.0 - 0.2 10e3/uL    Absolute Immature Granulocytes 0.0 <=0.4 10e3/uL    Absolute NRBCs 0.0  10e3/uL   iStat Gases (lactate) venous, POCT     Status: Abnormal   Result Value Ref Range    Lactic Acid POCT 7.4 (HH) <=2.0 mmol/L    Bicarbonate Venous POCT 16 (L) 21 - 28 mmol/L    O2 Sat, Venous POCT 61 (L) 70 - 75 %    pCO2 Venous POCT 13 (LL) 40 - 50 mm Hg    pH Venous POCT 7.70 (HH) 7.32 - 7.43    pO2 Venous POCT 22 (L) 25 - 47 mm Hg   Lactic acid whole blood     Status: Abnormal   Result Value Ref Range    Lactic Acid 7.1 (HH) 0.7 - 2.0 mmol/L   Procalcitonin     Status: Normal   Result Value Ref Range    Procalcitonin 0.03 <0.50 ng/mL   Arterial Panel POCT     Status: Abnormal   Result Value Ref Range    pH Arterial POCT 7.41 7.35 - 7.45    pCO2 Arterial POCT 39 35 - 45 mm Hg    pO2 Arterial POCT 290 (H) 80 - 105 mm Hg    Bicarbonate Arterial POCT 25 21 - 28 mmol/L    Sodium POCT 143 135 - 145 mmol/L    Potassium POCT 3.2 (L) 3.4 - 5.3 mmol/L    Hemoglobin POCT 11.3 (L) 11.7 - 15.7 g/dL    Glucose Whole Blood POCT 206 (H) 70 - 99 mg/dL    Calcium, Ionized Whole Blood POCT 4.4 4.4 - 5.2 mg/dL    Base Excess/Deficit (+/-) POCT 0.0 -3.0 - 3.0 mmol/L    FIO2 POCT 53.0 %    O2 Sat, Arterial POCT 98 (H) 95 - 96 %    Lactic Acid POCT 1.0 0.7 - 2.0 mmol/L    Oxyhemoglobin POCT 98 92 - 100 %    Narrative    In healthy individuals, oxyhemoglobin (O2Hb) and oxygen saturation (SO2) are approximately equal. In the presence of dyshemoglobins, oxyhemoglobin can be considerably lower than oxygen saturation.   Arterial Panel POCT     Status: Abnormal   Result Value Ref Range    pH Arterial POCT 7.44 7.35 - 7.45    pCO2 Arterial POCT 38 35 - 45 mm Hg    pO2 Arterial POCT 290 (H) 80 - 105 mm Hg    Bicarbonate Arterial POCT 26 21 - 28 mmol/L    Sodium POCT 142 135 - 145 mmol/L    Potassium POCT 3.5 3.4 - 5.3 mmol/L    Hemoglobin POCT 11.5 (L) 11.7 - 15.7 g/dL    Glucose Whole Blood POCT 166 (H) 70 - 99 mg/dL    Calcium, Ionized Whole Blood POCT 5.0 4.4 - 5.2 mg/dL    Base Excess/Deficit (+/-) POCT 2.1 -3.0 - 3.0 mmol/L     FIO2 POCT 53.0 %    O2 Sat, Arterial POCT 98 (H) 95 - 96 %    Lactic Acid POCT 0.7 0.7 - 2.0 mmol/L    Oxyhemoglobin POCT 98 92 - 100 %    Narrative    In healthy individuals, oxyhemoglobin (O2Hb) and oxygen saturation (SO2) are approximately equal. In the presence of dyshemoglobins, oxyhemoglobin can be considerably lower than oxygen saturation.   EKG 12-lead, tracing only     Status: None   Result Value Ref Range    Systolic Blood Pressure  mmHg    Diastolic Blood Pressure  mmHg    Ventricular Rate 45 BPM    Atrial Rate 45 BPM    AK Interval 108 ms    QRS Duration 84 ms     ms    QTc 487 ms    P Axis 44 degrees    R AXIS -32 degrees    T Axis 37 degrees    Interpretation ECG       Sinus bradycardia with sinus arrhythmia with short AK  Left axis deviation  Abnormal ECG  Unconfirmed report - interpretation of this ECG is computer generated - see medical record for final interpretation  Confirmed by - EMERGENCY ROOM, PHYSICIAN ([id number]),  MIKY QUINTANA ([id number]) on 2/18/2024 11:04:17 AM     ABO/Rh type and screen     Status: None (In process)    Narrative    The following orders were created for panel order ABO/Rh type and screen.  Procedure                               Abnormality         Status                     ---------                               -----------         ------                     Adult Type and Screen[[id number]]                            In process                   Please view results for these tests on the individual orders.   CBC with platelets differential     Status: Abnormal    Narrative    The following orders were created for panel order CBC with platelets differential.  Procedure                               Abnormality         Status                     ---------                               -----------         ------                     CBC with platelets and d...[[id number]]  Abnormal            Final result                 Please view results for these tests on the  individual orders.     Medications   ceFAZolin Sodium (ANCEF) injection 2 g (has no administration in time range)   potassium chloride 10 mEq in 100 mL sterile water infusion (has no administration in time range)   potassium chloride 10 mEq in 100 mL sterile water infusion (has no administration in time range)   ketamine (KETALAR) 2 mg/mL in sodium chloride 0.9 % 50 mL ANALGESIA infusion (has no administration in time range)   ketamine (KETALAR) injection 5 mg (has no administration in time range)   dextrose 10% infusion (has no administration in time range)   insulin regular (MYXREDLIN) 1 unit/mL infusion (2 Units/hr Intravenous Rate/Dose Change 2/18/24 1506)   glucose gel 15-30 g (has no administration in time range)     Or   dextrose 50 % injection 25-50 mL (has no administration in time range)     Or   glucagon injection 1 mg (has no administration in time range)   HYDROmorphone (PF) (DILAUDID) injection 0.5 mg (0.5 mg Intravenous $Given 2/18/24 1038)   sodium chloride 0.9% BOLUS 1,000 mL (1,000 mLs Intravenous $New Bag 2/18/24 1046)   ondansetron (ZOFRAN) injection 4 mg (4 mg Intravenous $Given 2/18/24 1038)   iopamidol (ISOVUE-370) solution 95 mL (95 mLs Intravenous $Given 2/18/24 1146)   sodium chloride (PF) 0.9% PF flush 74 mL (74 mLs Intravenous $Given 2/18/24 1146)   HYDROmorphone (PF) (DILAUDID) injection 0.5 mg (0.5 mg Intravenous $Given 2/18/24 1123)   ceFAZolin Sodium (ANCEF) injection 2 g (2 g Intravenous $Given 2/18/24 1228)   metroNIDAZOLE (FLAGYL) infusion 500 mg (500 mg Intravenous $Given 2/18/24 1228)     Labs Ordered and Resulted from Time of ED Arrival to Time of ED Departure   COMPREHENSIVE METABOLIC PANEL - Abnormal       Result Value    Sodium 144      Potassium 3.2 (*)     Carbon Dioxide (CO2) 15 (*)     Anion Gap 27 (*)     Urea Nitrogen 14.0      Creatinine 0.80      GFR Estimate 80      Calcium 9.8      Chloride 102      Glucose 208 (*)     Alkaline Phosphatase 55      AST 32      ALT 21       Protein Total 6.7      Albumin 4.4      Bilirubin Total 0.6     CBC WITH PLATELETS AND DIFFERENTIAL - Abnormal    WBC Count 5.8      RBC Count 3.76 (*)     Hemoglobin 11.8      Hematocrit 35.0      MCV 93      MCH 31.4      MCHC 33.7      RDW 13.7      Platelet Count 276      % Neutrophils 36      % Lymphocytes 51      % Monocytes 8      % Eosinophils 4      % Basophils 1      % Immature Granulocytes 0      NRBCs per 100 WBC 0      Absolute Neutrophils 2.0      Absolute Lymphocytes 2.9      Absolute Monocytes 0.5      Absolute Eosinophils 0.2      Absolute Basophils 0.1      Absolute Immature Granulocytes 0.0      Absolute NRBCs 0.0     ISTAT GASES LACTATE VENOUS POCT - Abnormal    Lactic Acid POCT 7.4 (*)     Bicarbonate Venous POCT 16 (*)     O2 Sat, Venous POCT 61 (*)     pCO2 Venous POCT 13 (*)     pH Venous POCT 7.70 (*)     pO2 Venous POCT 22 (*)    LACTIC ACID WHOLE BLOOD - Abnormal    Lactic Acid 7.1 (*)    CRP INFLAMMATION - Normal    CRP Inflammation <3.00     ERYTHROCYTE SEDIMENTATION RATE AUTO - Normal    Erythrocyte Sedimentation Rate 14     INR - Normal    INR 1.07     PROCALCITONIN - Normal    Procalcitonin 0.03     ROUTINE UA WITH MICROSCOPIC REFLEX TO CULTURE   TYPE AND SCREEN, ADULT   ABO/RH TYPE AND SCREEN     CT Abdomen Pelvis w Contrast   Final Result   IMPRESSION:       1. Interval increased distention of the cecum, wall thickening/edema,   altered location of IC junction compared to prior and adjacent   mesenteric swirling is concerning for cecal volvulus/closed loop   obstruction. Hypoenhancement of the cecal wall is concerning for   associated ischemic changes.       2. New extensive mesenteric edema and small volume ascites.        3. Similar appearance of congenital intestinal malrotation and   postoperative changes of Manan-en-Y.            Patient is already scheduled for an exploratory laparotomy at the time   of scan review.         I have personally reviewed the examination and  initial interpretation   and I agree with the findings.      ANAND COMBS MD            SYSTEM ID:  [id number]      POC US Guidance Needle Placement    (Results Pending)          Critical care was performed.   Critical Care Addendum  My initial assessment, based on my review of prehospital provider report, review of nursing observations, review of vital signs, focused history, physical exam, review of cardiac rhythm monitor, 12 lead ECG analysis, and discussion with Cpolorectal Surg , established a high suspicion that Fany Bowman has  possible ischemic bowel with closed loop obstruction , which requires immediate intervention, and therefore she is critically ill.     After the initial assessment, the care team initiated multiple lab tests, initiated IV fluid administration, initiated medication therapy with IVF pain meds, and consulted with Colorectal Surg  to provide stabilization care. Due to the critical nature of this patient, I reassessed nursing observations, vital signs, physical exam, review of cardiac rhythm monitor, 12 lead ECG analysis, mental status, neurologic status, and respiratory status multiple times prior to her disposition.     Time also spent performing documentation, discussion with family to obtain medical information for decision making, reviewing test results, discussion with consultants, and coordination of care.     Critical care time (excluding teaching time and procedures): 45 minutes.     Assessment & Plan    Acute excrusiating abd pain in the pt with malrotation of colon scheduled to have surgery in 3/2024 with Colorectal Surg, VCG4 very abnormal with elevated lactic acid, low bicarb but also respiratory alkalosis, given rounds of dilaudid, IVF and CT-showed probably cecal volvulus, Colorectal Surg to OR.    I have reviewed the nursing notes. I have reviewed the findings, diagnosis, plan and need for follow up with the patient.    Current Discharge Medication List          Final  diagnoses:   Cecal volvulus (H)   Malrotation cecum (H28)   I, Jonathon Kaba, am serving as a trained medical scribe to document services personally performed by Dang Monet MD, based to the provider's statements to me.     IDang MD, was physically present and have reviewed and verified the accuracy of this note documented by Jonathon Kaba.       Dang Monet MD  [facility] EMERGENCY DEPARTMENT  2/18/2024     Dang Monet MD  02/18/24 1540

## 2024-02-19 ENCOUNTER — APPOINTMENT (OUTPATIENT)
Dept: PHYSICAL THERAPY | Facility: CLINIC | Age: 69
DRG: 329 | End: 2024-02-19
Payer: COMMERCIAL

## 2024-02-19 LAB
ANION GAP SERPL CALCULATED.3IONS-SCNC: 10 MMOL/L (ref 7–15)
BUN SERPL-MCNC: 12.3 MG/DL (ref 8–23)
CALCIUM SERPL-MCNC: 9 MG/DL (ref 8.8–10.2)
CHLORIDE SERPL-SCNC: 105 MMOL/L (ref 98–107)
CREAT SERPL-MCNC: 0.78 MG/DL (ref 0.51–0.95)
DEPRECATED HCO3 PLAS-SCNC: 24 MMOL/L (ref 22–29)
EGFRCR SERPLBLD CKD-EPI 2021: 82 ML/MIN/1.73M2
ERYTHROCYTE [DISTWIDTH] IN BLOOD BY AUTOMATED COUNT: 14.1 % (ref 10–15)
GLUCOSE SERPL-MCNC: 113 MG/DL (ref 70–99)
HCT VFR BLD AUTO: 31.5 % (ref 35–47)
HGB BLD-MCNC: 10.1 G/DL (ref 11.7–15.7)
MAGNESIUM SERPL-MCNC: 2.1 MG/DL (ref 1.7–2.3)
MCH RBC QN AUTO: 31.9 PG (ref 26.5–33)
MCHC RBC AUTO-ENTMCNC: 32.1 G/DL (ref 31.5–36.5)
MCV RBC AUTO: 99 FL (ref 78–100)
PHOSPHATE SERPL-MCNC: 5.5 MG/DL (ref 2.5–4.5)
PLATELET # BLD AUTO: 228 10E3/UL (ref 150–450)
POTASSIUM SERPL-SCNC: 4.4 MMOL/L (ref 3.4–5.3)
RBC # BLD AUTO: 3.17 10E6/UL (ref 3.8–5.2)
SODIUM SERPL-SCNC: 139 MMOL/L (ref 135–145)
WBC # BLD AUTO: 12.4 10E3/UL (ref 4–11)

## 2024-02-19 PROCEDURE — 80048 BASIC METABOLIC PNL TOTAL CA: CPT

## 2024-02-19 PROCEDURE — 250N000013 HC RX MED GY IP 250 OP 250 PS 637

## 2024-02-19 PROCEDURE — 120N000002 HC R&B MED SURG/OB UMMC

## 2024-02-19 PROCEDURE — 85027 COMPLETE CBC AUTOMATED: CPT

## 2024-02-19 PROCEDURE — 97116 GAIT TRAINING THERAPY: CPT | Mod: GP | Performed by: PHYSICAL THERAPIST

## 2024-02-19 PROCEDURE — 258N000003 HC RX IP 258 OP 636

## 2024-02-19 PROCEDURE — 97161 PT EVAL LOW COMPLEX 20 MIN: CPT | Mod: GP | Performed by: PHYSICAL THERAPIST

## 2024-02-19 PROCEDURE — 97530 THERAPEUTIC ACTIVITIES: CPT | Mod: GP | Performed by: PHYSICAL THERAPIST

## 2024-02-19 PROCEDURE — 36415 COLL VENOUS BLD VENIPUNCTURE: CPT

## 2024-02-19 PROCEDURE — 999N000111 HC STATISTIC OT IP EVAL DEFER

## 2024-02-19 PROCEDURE — 258N000003 HC RX IP 258 OP 636: Performed by: STUDENT IN AN ORGANIZED HEALTH CARE EDUCATION/TRAINING PROGRAM

## 2024-02-19 PROCEDURE — 83735 ASSAY OF MAGNESIUM: CPT

## 2024-02-19 PROCEDURE — 250N000009 HC RX 250

## 2024-02-19 PROCEDURE — 84100 ASSAY OF PHOSPHORUS: CPT

## 2024-02-19 PROCEDURE — 250N000011 HC RX IP 250 OP 636

## 2024-02-19 RX ORDER — NEOMYCIN SULFATE, POLYMYXIN B SULFATE AND DEXAMETHASONE 3.5; 10000; 1 MG/ML; [USP'U]/ML; MG/ML
1-2 SUSPENSION/ DROPS OPHTHALMIC EVERY 4 HOURS
Status: DISCONTINUED | OUTPATIENT
Start: 2024-02-19 | End: 2024-02-20

## 2024-02-19 RX ORDER — PAROXETINE 20 MG/1
20 TABLET, FILM COATED ORAL AT BEDTIME
Status: DISCONTINUED | OUTPATIENT
Start: 2024-02-19 | End: 2024-02-21 | Stop reason: HOSPADM

## 2024-02-19 RX ORDER — ATORVASTATIN CALCIUM 10 MG/1
10 TABLET, FILM COATED ORAL AT BEDTIME
Status: DISCONTINUED | OUTPATIENT
Start: 2024-02-19 | End: 2024-02-21 | Stop reason: HOSPADM

## 2024-02-19 RX ADMIN — ATORVASTATIN CALCIUM 10 MG: 10 TABLET, FILM COATED ORAL at 22:57

## 2024-02-19 RX ADMIN — NEOMYCIN SULFATE, POLYMYXIN B SULFATE AND DEXAMETHASONE 1 DROP: 3.5; 10000; 1 SUSPENSION OPHTHALMIC at 17:56

## 2024-02-19 RX ADMIN — ENOXAPARIN SODIUM 40 MG: 40 INJECTION SUBCUTANEOUS at 11:43

## 2024-02-19 RX ADMIN — OXYCODONE HYDROCHLORIDE 10 MG: 10 TABLET ORAL at 14:57

## 2024-02-19 RX ADMIN — ACETAMINOPHEN 975 MG: 325 TABLET, FILM COATED ORAL at 11:43

## 2024-02-19 RX ADMIN — SODIUM CHLORIDE, POTASSIUM CHLORIDE, SODIUM LACTATE AND CALCIUM CHLORIDE: 600; 310; 30; 20 INJECTION, SOLUTION INTRAVENOUS at 11:49

## 2024-02-19 RX ADMIN — PAROXETINE HYDROCHLORIDE 20 MG: 20 TABLET, FILM COATED ORAL at 22:57

## 2024-02-19 RX ADMIN — METHOCARBAMOL 500 MG: 500 TABLET ORAL at 04:06

## 2024-02-19 RX ADMIN — ACETAMINOPHEN 975 MG: 325 TABLET, FILM COATED ORAL at 20:39

## 2024-02-19 RX ADMIN — ACETAMINOPHEN 975 MG: 325 TABLET, FILM COATED ORAL at 04:06

## 2024-02-19 RX ADMIN — NEOMYCIN SULFATE, POLYMYXIN B SULFATE AND DEXAMETHASONE 1 DROP: 3.5; 10000; 1 SUSPENSION OPHTHALMIC at 20:40

## 2024-02-19 RX ADMIN — OXYCODONE HYDROCHLORIDE 10 MG: 10 TABLET ORAL at 19:33

## 2024-02-19 RX ADMIN — SODIUM CHLORIDE, POTASSIUM CHLORIDE, SODIUM LACTATE AND CALCIUM CHLORIDE: 600; 310; 30; 20 INJECTION, SOLUTION INTRAVENOUS at 01:16

## 2024-02-19 RX ADMIN — OXYCODONE HYDROCHLORIDE 5 MG: 5 TABLET ORAL at 06:48

## 2024-02-19 RX ADMIN — METHOCARBAMOL 500 MG: 500 TABLET ORAL at 18:04

## 2024-02-19 RX ADMIN — OXYCODONE HYDROCHLORIDE 5 MG: 5 TABLET ORAL at 01:15

## 2024-02-19 ASSESSMENT — ACTIVITIES OF DAILY LIVING (ADL)
ADLS_ACUITY_SCORE: 29

## 2024-02-19 NOTE — PLAN OF CARE
Occupational Therapy: Orders received. Chart reviewed and discussed with care team.? Occupational Therapy not indicated due to conversation with PT, pt ambulating SBA, lives with spouse who can assist with ADL/IADLs as needed upon discharge. Pt had just completed bathing ADLs when PT was entering room this am, did not state any acute IP OT needs at this time, no ADL/IADL concerns. Please re consult if needs arise.? Defer discharge recommendations to Physical Therapy and medical team.? Will complete orders.

## 2024-02-19 NOTE — PROGRESS NOTES
CLINICAL NUTRITION SERVICES  Reason for Assessment:  Nutrition education regarding low-phos foods   Diet History: Pt started on low-phos diet today r/t elevated seum phos.   Nutrition Diagnosis:  Food- and nutrition-related knowledge deficit r/t no previous knowledge of lower-phos foods AEB pt self-report.   Interventions:  Provided instruction & provided handout (nutrition care manual) on lower phos food items & daily phos limits while on phos restriction.   Goals:   Patient will verbalize understanding of foods lower in phos content.   Follow-up:    Patient to ask any further nutrition-related questions before discharge.  In addition, pt may request outpatient RD appointment.   Dayana Santo, MPH, RDN, LD  5A (non-Heme Onc pts) + 7B (beds [phone]) RD pager: [phone] or Christo   Weekend/Holiday RD pager [phone]

## 2024-02-19 NOTE — PROGRESS NOTES
Admitted/transferred from: PACU  2 RN skin assessment completed by Cassius Mccurdy RN and Fidencio PERES RN  Skin assessment finding: Midline inc with primapore dressing in place  Interventions/actions: No active bleeding noted  Will continue to monitor.

## 2024-02-19 NOTE — PROGRESS NOTES
"Colorectal Surgery Progress Note  [facility]  POD#1      Subjective:  NAEO. Not passing gas, though feels \"gurgling\". Denies significant abdominal pain.     Vitals:  Vitals:    02/18/24 2246 02/19/24 0000 02/19/24 0400 02/19/24 0708   BP: 94/40 98/50 105/58 101/44   BP Location: Right arm Right arm Right arm Right arm   Pulse: 77 84 84 67   Resp: 14 16 16 18   Temp: 98.6  F (37  C) 98.8  F (37.1  C) 98.9  F (37.2  C) 99.2  F (37.3  C)   TempSrc: Oral Oral Oral Oral   SpO2: 96% 94% 100% 100%     I/O:  I/O last 3 completed shifts:  In: 3400 [P.O.:50; I.V.:3350]  Out: 1840 [Urine:1815; Blood:25]    Physical Exam:  Gen: AAOx3, NAD  Pulm: Non-labored breathing  Abd: Soft, non-distended, appropriately tender, no guarding   Dressing with moderate strikethrough  :  Gomez draining clear yellow urine  Ext:  Warm and well-perfused    BMP  Recent Labs   Lab 02/19/24  0638 02/18/24  2035 02/18/24  1619 02/18/24  1502 02/18/24  1403 02/18/24  1036     --   --  142 143 144   POTASSIUM 4.4  --   --  3.5 3.2* 3.2*   CHLORIDE 105  --   --   --   --  102   CO2 24  --   --   --   --  15*   BUN 12.3  --   --   --   --  14.0   CR 0.78 0.67  --   --   --  0.80   *  --  131* 166* 206* 208*   MAG 2.1  --   --   --   --   --    PHOS 5.5*  --   --   --   --   --      CBC  Recent Labs   Lab 02/19/24  0638 02/18/24  1502 02/18/24  1403 02/18/24  1017   WBC 12.4*  --   --  5.8   HGB 10.1* 11.5* 11.3* 11.8   HCT 31.5*  --   --  35.0     --   --  276         ASSESSMENT: This is a 68 year old female with past medical history of obesity s/p robotic joanna-en-y in May 2023 (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     Neuro/Pain: scheduled tylenol, prn dilaudid, prn oxycodone, robaxin prn  CV: q4h vitals, okay to " cluster to promote rest, soft but stable BP (ctm)  PULM: IS 10x/hr  - home CPAP as needed during sleep  - no acute hypoxic respiratory failure, saturating well on low flow nasal cannula  GI: zofran prn, low phosphorous diet   FEN: LR @ 75, continue mIVF/avoid phosphorous containing meds for hyperphosphatemia  : strict I/Os, remove cain today, GFR and Cr wnl   Heme: no acute concerns, monitoring CBC  ID: s/p periop abx  Activity: abd binder prn, OOB for meals, ambulate QID, HOB > 30  Ppx: lovenox  Dispo: anticipate 2-3 days    Clinically Significant Risk Factors Present on Admission        # Hypokalemia: Lowest K = 3.2 mmol/L in last 2 days, will replace as needed      # Anion Gap Metabolic Acidosis: Highest Anion Gap = 27 mmol/L in last 2 days, will monitor and treat as appropriate        # Non-Invasive mechanical ventilation: current O2 Device: BiPAP/CPAP  # Acute hypoxic respiratory failure: continue supplemental O2 as needed            - incorrect diagnosis, not in AHRF    Carline Mohamud MD   General Surgery PGY1   [phone]    Patient was seen and discussed with Dr. Rouse (Fellow) and communicated to me by Dr. Kaminski.

## 2024-02-19 NOTE — PROGRESS NOTES
"   02/19/24 1025   Appointment Info   Signing Clinician's Name / Credentials (PT) MARY Mireles   Student Supervision Therapy services provided with the co-signing licensed therapist guiding and directing the services, and providing the skilled judgement and assessment throughout the session;Direct supervision provided   Living Environment   People in Home spouse;other (see comments)  (dog)   Current Living Arrangements house   Home Accessibility stairs to enter home   Number of Stairs, Main Entrance 2   Stair Railings, Main Entrance none   Transportation Anticipated family or friend will provide   Living Environment Comments Pt lives in single level home with  and dog. 2 MIRIAN w/o rail. Bathtub w/ access to shower chair if needed. Pt reports no concern with going home.   Self-Care   Usual Activity Tolerance good   Current Activity Tolerance good   Regular Exercise Yes   Activity/Exercise Type walking;strength training  (machines at gym)   Exercise Amount/Frequency 3-5 times/wk   Equipment Currently Used at Home none   Fall history within last six months no   Activity/Exercise/Self-Care Comment Pt is IND in ADLs and mobility. Reports  can help if necessary. She goes to the gym 5x per week for walking and machines.   General Information   Onset of Illness/Injury or Date of Surgery 02/18/24   Referring Physician Mireya Yang MD   Patient/Family Therapy Goals Statement (PT) Get home and maintain activity tolerance/strength   Pertinent History of Current Problem (include personal factors and/or comorbidities that impact the POC) \"Fany Bowman is a 68 year old female with past medical history of obesity s/p robotic joanna-en-y in May 2023, TANK on CPAP, hypercholesterolemia, type 2 diabetes (last A1C was 5.4 in Nov 2023), and nonalcoholic fatty liver disease. 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 The bypass was performed with an antecolic, " "antegastric configuration. She presented in December 2023 with abdominal pain and what appeared to be a cecal bascule which resolved. She was seen this past week in clinic by my partner, Nelli Rubin, and was scheduled for surgery electively. She presents with severe pain since 8am. We are taking her emergently (now just past noon) for laparotomy as it appears on imaging she has an acute cecal volvulus.\"   Existing Precautions/Restrictions abdominal   General Observations Pt sitting comfortably in room and motivated to participate in therapy.   Cognition   Affect/Mental Status (Cognition) WNL   Follows Commands (Cognition) WNL   Cognitive Status Comments Pt is appropriate in conversation, alert and oriented to self and situation w/ good safety awareness.   Pain Assessment   Patient Currently in Pain Yes, see Vital Sign flowsheet   Integumentary/Edema   Integumentary/Edema Comments abdominal incision covered by abd binder   Posture    Posture Forward head position;Protracted shoulders;Kyphosis   Range of Motion (ROM)   ROM Comment WFL for figure 4 dressing and mobility   Strength (Manual Muscle Testing)   Strength (Manual Muscle Testing) strength is WFL   Strength Comments Pt denies weakness in LEs, pt maintains functional strength   Bed Mobility   Assistive Device (Bed Mobility) bed rails   Comment, (Bed Mobility) Pt performs scooting, rolling, and supine<>sit following precautions   Transfers   Comment, (Transfers) sit<>stand with UE use on bed IND   Gait/Stairs (Locomotion)   [place] Level (Gait) supervision   Assistive Device (Gait) other (see comments)  (IV pole to none)   Distance in Feet (Gait) 160'   Pattern (Gait) step-through   Deviations/Abnormal Patterns (Gait) gait speed decreased;base of support, narrow;stride length decreased   Comment, (Gait/Stairs) Pt ambulated 160' w/o AD w/ no sign of instability. Pt reports taking shorter steps and reducing speed to be cautious   Balance   Balance Comments no " instability with standing and walking   Clinical Impression   Criteria for Skilled Therapeutic Intervention Yes, treatment indicated   PT Diagnosis (PT) decreased mobility from baseline   Influenced by the following impairments decreased activity tolerance and pain, abdominal incision   Functional limitations due to impairments community distances and mobility w/in precautions   Clinical Presentation (PT Evaluation Complexity) stable   Clinical Presentation Rationale clinical judgement and medical status   Clinical Decision Making (Complexity) low complexity   Planned Therapy Interventions (PT) bed mobility training;home exercise program;patient/family education;stair training;strengthening;progressive activity/exercise   Risk & Benefits of therapy have been explained evaluation/treatment results reviewed;care plan/treatment goals reviewed;participants voiced agreement with care plan;participants included;patient;spouse/significant other   Clinical Impression Comments Pt is appropriate for skilled physical therapy to improve activity tolerance and mobility following precautions.   PT Total Evaluation Time   PT Eval, Low Complexity Minutes (38564) 8   Physical Therapy Goals   PT Frequency Daily   PT Predicted Duration/Target Date for Goal Attainment 02/23/24   PT Goals Gait;Stairs;Aerobic Activity;Bed Mobility;PT Goal 1   PT: Bed Mobility Independent;Supine to/from sit;Rolling;Bridging;Within precautions   PT: Gait Supervision/stand-by assist;Greater than 200 feet   PT: Stairs Supervision/stand-by assist;2 stairs   PT: Perform aerobic activity with stable cardiovascular response 30 minutes;ambulation;NuStep;treadmill;continuous activity   PT: Goal 1 Pt will be able to perform all mobility and ADLs while following abdominal precautions to reduce stress on surgical site.   PT Discharge Planning   PT Plan progress activity tolerance with ambulation, trial stairs and bathtub trx, continue bed mobility w/in precautions  and w/o rail to immitate home environment, HEP initiation (pt has handouts)   PT Discharge Recommendation (DC Rec) home with assist   PT Rationale for DC Rec Pt is below functional mobility due to decreased activity tolerance and pain. Anticipate safe home d/c once medically ready. Pt participates in exercise 5x per week at baseline and is given education on exercises to avoid. Pt should be able to improve activity tolerance and strength with IND HEP guidance.   PT Brief overview of current status SBA for mobility for safety, IND w/ ADLs, reminders of precautions may be needed   Total Session Time   Timed Code Treatment Minutes 40   Total Session Time (sum of timed and untimed services) 48

## 2024-02-19 NOTE — PLAN OF CARE
VSS. Pt up with assist of 1. Voids spont post cain removal. No gas, no BM. Tolerating low phosphorus diet. MIV infusing through PIV. Pain controled with Tylenol. Abdominal binder on. Cont. POC.

## 2024-02-19 NOTE — PROGRESS NOTES
Colorectal Surgery Progress Note  Surgery Cross-Cover  Post Op Check    02/18/2024    Fany Bowman is a 68 year old female POD#0 s/p Procedure(s):  Laparotomy exploratory  Colectomy right for Pre-Op Diagnosis Codes:     * Ischemic bowel disease (H24) [K55.9]    Pt reports their pain is controlled with current regimen. Denies nausea, SOB, chest pain, or dizziness. Patient is not passing flatus or having bowel movements and Is voiding via urinary catheter with appropriate UOP post-operatively.    /58 (BP Location: Right arm)   Pulse 77   Temp 98.5  F (36.9  C) (Oral)   Resp 17   SpO2 93%     Gen: A&O x4, NAD   Chest: breathing non-labored on nasal cannula at 2 liters per minute   Abdomen: soft, appropriately tender, non-distended, Incisions clean, dry, intact covered by dressings with moderate strikethrough at the inferior edge of the dressing  Extremities: warm and well perfused    A/P: No acute post-op issues. Continue current plan of care. Please page with any questions or concerns.      Moises Corey MD  General Surgery PGY-1  Pager: [phone]

## 2024-02-19 NOTE — PLAN OF CARE
Goal Outcome Evaluation: POD#1. Pt afebrile, vitals stable. Pt sleeping between cares. Abd round, +bs, denied flatus. Midline incision covered with primapore. Marked with no changes since 0000. Lungs clear bilat. IS enc= 6449-4475 ml. Incisional pain rated 6. Pain managed with sched tylenol and prn robaxin and oxycodone. Pt slept following. IVF via piv at 100/hr. Gomez patent with good uv noted. Pt dangled at 0000 with ease. Bijan well w/o dizziness or nausea. Cont to maintain pain control. Enc increased activity to promote return of bowel fxn. Enc IS.

## 2024-02-19 NOTE — PLAN OF CARE
POD0   VS: /58 (BP Location: Right arm)   Pulse 77   Temp 98.5  F (36.9  C) (Oral)   Resp 17   SpO2 93%    Neuro: A&O x4, able to make needs known  Respiratory: On 2L via NC, no report of SOB. Patient has home CPAP, RT to come and set up with O2  GI/: Gomez in place with output, BS hypoactive, no gas or BM yet  Diet/appetite: NPO except for meds and ice chips, patient tolerating well with no report of nausea.   Activity: NO oob yet, patient yet to dangle.  Pain: Moderate abdominal pain managed with robaxin, tylenol, oxycodone, and dilaudid.  Skin/drains: Midline incision with primapore dressing in place, shadowed drainage under dressing, dressing marked.   Lines: Emerson PIV, r PIV saline locked, L piv infusing LR @100ml/hr.  Continue to monitor for ROBF and manage pain. Continue POC.

## 2024-02-20 ENCOUNTER — APPOINTMENT (OUTPATIENT)
Dept: PHYSICAL THERAPY | Facility: CLINIC | Age: 69
DRG: 329 | End: 2024-02-20
Payer: COMMERCIAL

## 2024-02-20 LAB
ANION GAP SERPL CALCULATED.3IONS-SCNC: 8 MMOL/L (ref 7–15)
BUN SERPL-MCNC: 10.9 MG/DL (ref 8–23)
CALCIUM SERPL-MCNC: 8.7 MG/DL (ref 8.8–10.2)
CHLORIDE SERPL-SCNC: 105 MMOL/L (ref 98–107)
CREAT SERPL-MCNC: 0.64 MG/DL (ref 0.51–0.95)
DEPRECATED HCO3 PLAS-SCNC: 26 MMOL/L (ref 22–29)
EGFRCR SERPLBLD CKD-EPI 2021: >90 ML/MIN/1.73M2
ERYTHROCYTE [DISTWIDTH] IN BLOOD BY AUTOMATED COUNT: 14.2 % (ref 10–15)
GLUCOSE SERPL-MCNC: 100 MG/DL (ref 70–99)
HCT VFR BLD AUTO: 28.9 % (ref 35–47)
HGB BLD-MCNC: 9.5 G/DL (ref 11.7–15.7)
MAGNESIUM SERPL-MCNC: 1.8 MG/DL (ref 1.7–2.3)
MCH RBC QN AUTO: 32.1 PG (ref 26.5–33)
MCHC RBC AUTO-ENTMCNC: 32.9 G/DL (ref 31.5–36.5)
MCV RBC AUTO: 98 FL (ref 78–100)
PATH REPORT.COMMENTS IMP SPEC: NORMAL
PATH REPORT.COMMENTS IMP SPEC: NORMAL
PATH REPORT.FINAL DX SPEC: NORMAL
PATH REPORT.GROSS SPEC: NORMAL
PATH REPORT.MICROSCOPIC SPEC OTHER STN: NORMAL
PATH REPORT.RELEVANT HX SPEC: NORMAL
PHOSPHATE SERPL-MCNC: 3 MG/DL (ref 2.5–4.5)
PHOTO IMAGE: NORMAL
PLATELET # BLD AUTO: 182 10E3/UL (ref 150–450)
POTASSIUM SERPL-SCNC: 4 MMOL/L (ref 3.4–5.3)
RBC # BLD AUTO: 2.96 10E6/UL (ref 3.8–5.2)
SODIUM SERPL-SCNC: 139 MMOL/L (ref 135–145)
WBC # BLD AUTO: 8.4 10E3/UL (ref 4–11)

## 2024-02-20 PROCEDURE — 80048 BASIC METABOLIC PNL TOTAL CA: CPT

## 2024-02-20 PROCEDURE — 83735 ASSAY OF MAGNESIUM: CPT

## 2024-02-20 PROCEDURE — 85027 COMPLETE CBC AUTOMATED: CPT

## 2024-02-20 PROCEDURE — 84100 ASSAY OF PHOSPHORUS: CPT

## 2024-02-20 PROCEDURE — 250N000013 HC RX MED GY IP 250 OP 250 PS 637

## 2024-02-20 PROCEDURE — 250N000011 HC RX IP 250 OP 636

## 2024-02-20 PROCEDURE — 258N000003 HC RX IP 258 OP 636: Performed by: STUDENT IN AN ORGANIZED HEALTH CARE EDUCATION/TRAINING PROGRAM

## 2024-02-20 PROCEDURE — 97530 THERAPEUTIC ACTIVITIES: CPT | Mod: GP | Performed by: PHYSICAL THERAPIST

## 2024-02-20 PROCEDURE — 36415 COLL VENOUS BLD VENIPUNCTURE: CPT

## 2024-02-20 PROCEDURE — 120N000002 HC R&B MED SURG/OB UMMC

## 2024-02-20 RX ADMIN — SODIUM CHLORIDE, POTASSIUM CHLORIDE, SODIUM LACTATE AND CALCIUM CHLORIDE: 600; 310; 30; 20 INJECTION, SOLUTION INTRAVENOUS at 02:24

## 2024-02-20 RX ADMIN — OXYCODONE HYDROCHLORIDE 10 MG: 10 TABLET ORAL at 12:20

## 2024-02-20 RX ADMIN — ATORVASTATIN CALCIUM 10 MG: 10 TABLET, FILM COATED ORAL at 22:02

## 2024-02-20 RX ADMIN — OXYCODONE HYDROCHLORIDE 10 MG: 10 TABLET ORAL at 02:24

## 2024-02-20 RX ADMIN — ACETAMINOPHEN 975 MG: 325 TABLET, FILM COATED ORAL at 04:41

## 2024-02-20 RX ADMIN — NEOMYCIN SULFATE, POLYMYXIN B SULFATE AND DEXAMETHASONE 2 DROP: 3.5; 10000; 1 SUSPENSION OPHTHALMIC at 07:47

## 2024-02-20 RX ADMIN — ACETAMINOPHEN 975 MG: 325 TABLET, FILM COATED ORAL at 22:02

## 2024-02-20 RX ADMIN — OXYCODONE HYDROCHLORIDE 10 MG: 10 TABLET ORAL at 07:47

## 2024-02-20 RX ADMIN — ACETAMINOPHEN 975 MG: 325 TABLET, FILM COATED ORAL at 12:20

## 2024-02-20 RX ADMIN — METHOCARBAMOL 500 MG: 500 TABLET ORAL at 18:33

## 2024-02-20 RX ADMIN — OXYCODONE HYDROCHLORIDE 10 MG: 10 TABLET ORAL at 18:34

## 2024-02-20 RX ADMIN — ENOXAPARIN SODIUM 40 MG: 40 INJECTION SUBCUTANEOUS at 12:20

## 2024-02-20 RX ADMIN — PAROXETINE HYDROCHLORIDE 20 MG: 20 TABLET, FILM COATED ORAL at 22:02

## 2024-02-20 RX ADMIN — NEOMYCIN SULFATE, POLYMYXIN B SULFATE AND DEXAMETHASONE 1 DROP: 3.5; 10000; 1 SUSPENSION OPHTHALMIC at 04:42

## 2024-02-20 ASSESSMENT — ACTIVITIES OF DAILY LIVING (ADL)
ADLS_ACUITY_SCORE: 32
ADLS_ACUITY_SCORE: 31
ADLS_ACUITY_SCORE: 31
ADLS_ACUITY_SCORE: 29
ADLS_ACUITY_SCORE: 29
ADLS_ACUITY_SCORE: 32
ADLS_ACUITY_SCORE: 31
ADLS_ACUITY_SCORE: 29
ADLS_ACUITY_SCORE: 29

## 2024-02-20 NOTE — PROGRESS NOTES
Brief Patient Care Update   Colorectal Surgery    2/20 PM: 3 cm area of staple dehiscence noted with some serosanguinous leakage. Area reclosed with steristrips. There is a short strip of packing in the incision (placed to prevent fluid collection) that can be removed prior to discharge. Let service change.    Carline Mohamud MD   General Surgery PGY1   [phone]      Colorectal Surgery Progress Note  [facility]  POD#2    Subjective:  NAEO. Now passing gas, tolerating small amounts of a regular diet. No bowel movements yet.     Vitals:  Vitals:    02/19/24 1603 02/20/24 0002 02/20/24 0439 02/20/24 0743   BP: 113/51 120/64 124/62 131/62   BP Location: Right arm Right arm Right arm Left arm   Patient Position:       Pulse: 72 60 69 62   Resp: 18 18 18 16   Temp: 98.4  F (36.9  C) 97.6  F (36.4  C) 98.1  F (36.7  C) 98.1  F (36.7  C)   TempSrc: Oral Oral Oral Oral   SpO2: 99% 96% 96% 93%   Weight:         I/O:  I/O last 3 completed shifts:  In: 1364.17 [P.O.:720; I.V.:644.17]  Out: 425 [Urine:425]    Physical Exam:  Gen: AAOx3, NAD, resting comfortably  Pulm: Non-labored breathing, CPAP in place  Abd: Soft, non-distended, minimally tender, no guarding   Dressing removed, incision C/D/I with trace serosanguinous fluid (expected POD2)  :  Gomez removed  Ext:  Warm and well-perfused    BMP  Recent Labs   Lab 02/20/24  0602 02/19/24  0638 02/18/24  2035 02/18/24  1619 02/18/24  1502 02/18/24  1403 02/18/24  1403 02/18/24  1036    139  --   --  142  --  143 144   POTASSIUM 4.0 4.4  --   --  3.5  --  3.2* 3.2*   CHLORIDE 105 105  --   --   --   --   --  102   CO2 26 24  --   --   --   --   --  15*   BUN 10.9 12.3  --   --   --   --   --  14.0   CR 0.64 0.78 0.67  --   --   --   --  0.80   * 113*  --  131* 166*   < > 206* 208*   MAG 1.8 2.1  --   --   --   --   --   --    PHOS 3.0 5.5*  --   --   --   --   --   --     < > = values in this interval not displayed.     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 Recovering well postoperatively.     Neuro/Pain: scheduled tylenol, prn dilaudid (discontinue today), prn oxycodone, robaxin prn  CV: q4h vitals, okay to cluster to promote rest  PULM: IS 10x/hr  GI: zofran prn, regular diet (hyperphosphatemia improved)  FEN: LR @ 75 (discontinue today)  : strict I/Os, voiding independently, GFR and Cr wnl   Heme: no acute concerns, monitoring CBC  ID: s/p periop abx  Activity: abd binder prn, OOB for meals, ambulate QID, HOB > 30  Ppx: lovenox  Dispo: anticipate discharge tomorrow, without lovenox teaching     Clinically Significant Risk Factors        # Hypokalemia: Lowest K = 3.2 mmol/L in last 2 days, will replace as needed                             Carline Mohamud MD   General Surgery PGY1   [phone]    Patient was seen and discussed with Dr. Rouse (Fellow) and communicated to me by Dr. Kaminski.

## 2024-02-20 NOTE — PLAN OF CARE
Goal Outcome Evaluation:    [id number]  VSS. Alert and oriented. Reporting 8/10 abd pain. PRN oxy given. Midline incision has old drainage, nothing new noted. Abd binder in use. Continue with POC.

## 2024-02-20 NOTE — CONSULTS
SPIRITUAL HEALTH SERVICES Consult Note  [facility] 5A    I visited Ebonie per Routine Consult.  I introduced my role as an extra layer of emotional and spiritual support.  Ebonie expressed that she'd like to rest right now, but would be open to a visit another time.  I will check back with Ebonie in the next few days.    Claudia Corral  Chaplain Resident  Pager [phone]    * [facility] remains available 24/7 for emergent requests/referrals, either by having the switchboard page the on-call  or by entering an ASAP/STAT consult in Epic (this will also page the on-call ). Routine Epic consults receive an initial response within 24 hours.*

## 2024-02-20 NOTE — PLAN OF CARE
Discharge Instructions for a Drainage Tube Insertion    You had a drainage tube placed today. An Interventional Radiologist inserted a drainage tube that is used to remove fluid from a collection/pocket around or under an incision (seroma) or from an abscess (infection) which may be in your abdomen, pelvis or near a recent surgical incision.     During and After the procedure:  During the procedure, registered nurses and radiology technologists were present at all times to monitor your vital signs, maintain a sterile environment and make sure you were as comfortable as possible. Additionally, warm blankets were provided for your comfort. You were monitored closely after the procedure to make sure your vitals returned to baseline and that you were able to safely leave the facility in the care of your friends or family.     Home Care  Take it easy for the remainder of the day due to lingering effects of the sedation you may have been given. Your balance and reaction times may not be normal for the next 24 hours. Use extreme caution with ambulation and any other activities. Do not drive or drink alcoholic beverages and do not sign any legal documents for the next 24 hours.    Keep the site covered and dry at all times. You may shower but keep the site/tube dry at all times. The dressing should never be wet. Never sit in a bath tub, pool or hot tub with the site submerged under water as the bacteria could cause infection.    Do not lift anything heavier than a gallon of milk for 5-7 days after the procedure. Ask your doctor when you can return to work. Be sure to tell your doctor if your job involves heavy lifting or other strenuous activity. Tomorrow you may return to your normal activities.    Caring for your Catheter:  Empty the bulb when full and clean the skin around the drain whenever wet or as often as you are instructed by your health care provider. Follow the steps below.    This is what you’ll  Goal Outcome Evaluation:      Plan of Care Reviewed With: patient    Overall Patient Progress: improvingOverall Patient Progress: improving       Nursing note    Pain/Comfort: Patient c/o 5/10 - 8/10 abdominal pain. Pain treated with 10mg oral oxycodone PRN every 4hours.     Primary problem: POD 1, rue and y site appendectomy, malrotation of cecum  Assessments/Progress: Primary focus of care this shift was pain management. Abdominal binder was rewrapped this shift. Midline incision is covered. Small maroon, old drainage. Abdominal pad in place over midline and changed x1 this shift.      Pt has diminished appetite this shift. Low phosphorus diet. wearing nasal CPAP while asleep. Continuous pulse oximetry. Pt used incentive spirometer with encouragement.     Pt voiding normally with good UOP. No BM however patient started passing flatus in late evening. Bowel sounds hypoactive in left quadrants upon auscultation. Bowel sounds audible in right quadrants. Abdomen soft upon palpation.    Priority nursing care for next shift: pain mangement  Discharge plan/ barriers to discharge: pending       need:  · Disposable gloves  · Measuring cup  · Record sheet  · Gauze or paper towel  · Sterile cotton swabs or 4\" x 4\" gauze pads  · Sterile saline or 0.5% hydrogen peroxide       1. Empty the Bulb  · Wash your hands. Put on disposable gloves.  · Point the top of the bulb away from you and remove the stopper.  · Turn the bulb upside down over a measuring cup. Squeeze the fluid into the cup. Make sure the bulb is totally empty.  · Record the amount of fluid in the cup. Then empty the cup as directed.    2. Clean and Reconnect the Bulb  · Clean the top of the bulb with clean gauze or a paper towel, if needed.  · Roll the bulb up tight to squeeze out all the air. Keep the bulb rolled, and put the stopper back on the top.    3. Clean the Site  · Wash your hands. Put on disposable gloves.  · Wet a sterile cotton swab or 4\" x 4\" gauze pad with sterile saline or 0.5% hydrogen peroxide.  · Gently clean the skin around the drain. Always wipe away from the incision.  · Apply an antibacterial ointment if directed.     If the Tube Isn't Draining  · Uncurl any kinks in the tube.  · With one hand, firmly hold the base of the tube between your thumb and index finger. Do not touch the incision.  · Put the thumb and index finger of your other hand on the tube, next to the first hand. Pinch your fingers together. Then pull them along the tube toward the bag. This will help push any clogged fluid through the tube.     When to Call Your Doctor  Call your doctor immediately if you have any of the following:  · Bleeding from the drainage site  · Dizziness or lightheadedness  · Sudden or increased shortness of breath  · Sudden chest pain  · Fever above 100.4°F  · Shaking chills  · Increasing redness, tenderness, or swelling at the procedure site  · Increasing pain, with or without activity     IZABELLA  Follow up:  You may need additional imaging to determine whether your drain can be removed.  Measure and record daily drainage outputs as we  will call you periodically to get the information. Contact the Interventional Radiology Nurse Practitioner or the IR  at [phone] if you have any questions or concerns related to the care of your drain.    Thank you for using [facility] today. We hope you had a good experience for your healthcare needs.

## 2024-02-20 NOTE — PLAN OF CARE
VSS. Pt up ad chantelle. Voids spont with adequate UOP. Pain controlled with oxycodone x2. Passing gas, no BM. Tolerating regular diet. Midline incision with small amount of serosang drainage. Cont. POC.

## 2024-02-21 VITALS
OXYGEN SATURATION: 94 % | SYSTOLIC BLOOD PRESSURE: 107 MMHG | WEIGHT: 151.9 LBS | TEMPERATURE: 98.5 F | DIASTOLIC BLOOD PRESSURE: 54 MMHG | RESPIRATION RATE: 16 BRPM | HEART RATE: 72 BPM | BODY MASS INDEX: 23.79 KG/M2

## 2024-02-21 PROBLEM — K55.9 ISCHEMIC BOWEL DISEASE (H): Status: RESOLVED | Noted: 2024-02-18 | Resolved: 2024-02-21

## 2024-02-21 PROBLEM — K56.2 CECAL VOLVULUS (H): Status: RESOLVED | Noted: 2024-02-18 | Resolved: 2024-02-21

## 2024-02-21 LAB — PLATELET # BLD AUTO: 192 10E3/UL (ref 150–450)

## 2024-02-21 PROCEDURE — 36415 COLL VENOUS BLD VENIPUNCTURE: CPT

## 2024-02-21 PROCEDURE — 250N000013 HC RX MED GY IP 250 OP 250 PS 637

## 2024-02-21 PROCEDURE — 85049 AUTOMATED PLATELET COUNT: CPT

## 2024-02-21 RX ORDER — ACETAMINOPHEN 325 MG/1
975 TABLET ORAL EVERY 8 HOURS
Qty: 75 TABLET | Refills: 0 | Status: SHIPPED | OUTPATIENT
Start: 2024-02-21

## 2024-02-21 RX ORDER — OXYCODONE HYDROCHLORIDE 5 MG/1
5-10 TABLET ORAL EVERY 4 HOURS PRN
Qty: 12 TABLET | Refills: 0 | Status: CANCELLED | OUTPATIENT
Start: 2024-02-21

## 2024-02-21 RX ORDER — METHOCARBAMOL 500 MG/1
500 TABLET, FILM COATED ORAL 4 TIMES DAILY PRN
Qty: 20 TABLET | Refills: 0 | Status: SHIPPED | OUTPATIENT
Start: 2024-02-21

## 2024-02-21 RX ORDER — POLYETHYLENE GLYCOL 3350 17 G/17G
17 POWDER, FOR SOLUTION ORAL DAILY PRN
Qty: 510 G | Refills: 0 | Status: SHIPPED | OUTPATIENT
Start: 2024-02-21 | End: 2024-02-21

## 2024-02-21 RX ORDER — OXYCODONE HYDROCHLORIDE 5 MG/1
5 TABLET ORAL EVERY 6 HOURS PRN
Qty: 18 TABLET | Refills: 0 | Status: SHIPPED | OUTPATIENT
Start: 2024-02-21

## 2024-02-21 RX ORDER — POLYETHYLENE GLYCOL 3350 17 G/17G
17 POWDER, FOR SOLUTION ORAL DAILY PRN
Qty: 510 G | Refills: 0 | Status: SHIPPED | OUTPATIENT
Start: 2024-02-21

## 2024-02-21 RX ADMIN — ACETAMINOPHEN 975 MG: 325 TABLET, FILM COATED ORAL at 06:17

## 2024-02-21 RX ADMIN — OXYCODONE HYDROCHLORIDE 10 MG: 10 TABLET ORAL at 10:07

## 2024-02-21 RX ADMIN — OXYCODONE HYDROCHLORIDE 10 MG: 10 TABLET ORAL at 04:16

## 2024-02-21 ASSESSMENT — ACTIVITIES OF DAILY LIVING (ADL)
ADLS_ACUITY_SCORE: 26
ADLS_ACUITY_SCORE: 29

## 2024-02-21 NOTE — PLAN OF CARE
Goal Outcome Evaluation:    POD# 2 s/p Ex lap, Bowel Resection. Pt is A&O x 4, pain well controlled with current regimen. PIV in place. Denies nausea, tolerating regular diet, pt ate 100% of her dinner meal. Pt is voiding and ambulating without difficulty, reports positive flatus, no BM. Abdominal binder in place. Using the IS independently, PCDs in place. Pt is independent with cares.

## 2024-02-21 NOTE — PLAN OF CARE
Goal Outcome Evaluation:      Plan of Care Reviewed With: patient    Overall Patient Progress: improvingOverall Patient Progress: improving    /56 (BP Location: Left arm)   Pulse 68   Temp 97.6  F (36.4  C) (Oral)   Resp 18   Wt 68.9 kg (151 lb 14.4 oz)   SpO2 99%   BMI 23.79 kg/m      Neuro: A&Ox4.   Cardiac: Afebrile, VSS.   Respiratory: RA   GI/: Voiding spontaneously. No BM this shift.   Diet/appetite: Tolerating diet. Denies nausea   Activity: Up independently in room   Pain: pain maintained at tolerable level with prn oxycodone and scheduled tylenol.  Skin: scant amt serosang drain from dehisced area of suture line. Rest of incision wdl. Abd binder in place.   Lines: piv sl'd    Rested btwn cares. Able to make needs known. Continue to monitor. Notify MD of changes/concerns

## 2024-02-21 NOTE — DISCHARGE SUMMARY
"[facility]  Discharge Summary  Colon and Rectal Surgery     Fany Bowman MRN# [id number]   YOB: 1955 Age: 68 year old     Date of Admission:  2/18/2024  Date of Discharge::  2/21/2024  Admitting Physician:  Telma Kaminski MD  Discharge Physician:  Telma Kaminski MD  Primary Care Physician:        No Ref-Primary, Physician          Admission Diagnoses:   Acute abdominal pain  Ischemic bowel disease (H24) [K55.9]  Malrotation cecum (H28) [Q43.3]  History of congenital malrotation of the colon  History of obesity s/p prior Manan-en-Y with anticolic, antigastric Manan limb in May 2023  DM2  TANK  NAFLD         Discharge Diagnosis:     Acute abdomen, malrotation of intestine, previous gastric bypass, cecal volvulus with ischemia and internal hernia from adhesive band and constricted Leija defect.          Procedures:   2/18/2024:   Laparotomy exploratory, right hemicolectomy, adhesiolysis, closure of Leija defect, closure of ileocolic defect \"M-defect\", and closure of jejunojejunostomy defect.          Consultations:   PHYSICAL THERAPY ADULT IP CONSULT  OCCUPATIONAL THERAPY ADULT IP CONSULT  SPIRITUAL HEALTH SERVICES IP CONSULT         Imaging Studies:     Results for orders placed or performed during the hospital encounter of 02/18/24   CT Abdomen Pelvis w Contrast    Narrative    EXAMINATION: CT ABDOMEN PELVIS W CONTRAST  2/18/2024 12:08 PM      CLINICAL HISTORY: pain h/o malrotation    COMPARISON: CT 12/19/2023    PROCEDURE COMMENTS: CT of the abdomen was performed with 74ml Isovue  370 intravenous and oral contrast. Coronal and sagittal reformatted  images were obtained.    FINDINGS:  Lower thorax: No acute consolidations. Few scattered calcified  granulomas in right lung base    Abdomen and pelvis:  Liver: No mass. No intrahepatic biliary ductal dilation.    Biliary System: Normal gallbladder. No extrahepatic biliary " ductal  dilation.    Pancreas: Unchanged 0.8 cm low attenuating focus in the uncinate  process, probably representing interdigitating fat versus sidebranch  IPMN (4/146). No pancreatic ductal dilation. Moderate diffuse fatty  atrophy.    Adrenal glands: No mass or nodules    Spleen: Normal.    Kidneys: No suspicious mass, obstructing calculus or hydronephrosis.  Bilateral parapelvic cysts. Focal cortical hypodensities, too small to  characterize and statistically favored to represent simple cysts.    Gastrointestinal tract: Redemonstrated congenital bowel malrotation.  There is increased distention of the cecum which is present in the  left upper quadrant, measuring up to 8.7 cm, with wall thickening and  edema. There is associated new swirling of the adjacent mesentery,  best appreciated on series 6, image 28. The cecal wall is  hypoenhancing. Inverted location of the IC junction compared to prior.  Postoperative changes of Manan-en-Y. Postoperative changes of the small  bowel in the right upper quadrant.     Mesentery/peritoneum/retroperitoneum: Hazy edematous appearance of the  central mesentery New small volume ascites. No free air.     Lymph nodes: No significant lymphadenopathy.    Vasculature: Patent major abdominal vasculature.    Pelvis: Urinary bladder is normal.    Osseous structures: No aggressive or acute osseous lesion.      Soft tissues: Mild anasarca.      Impression    IMPRESSION:     1. Interval increased distention of the cecum, wall thickening/edema,  altered location of IC junction compared to prior and adjacent  mesenteric swirling is concerning for cecal volvulus/closed loop  obstruction. Hypoenhancement of the cecal wall is concerning for  associated ischemic changes.     2. New extensive mesenteric edema and small volume ascites.      3. Similar appearance of congenital intestinal malrotation and  postoperative changes of Manan-en-Y.        Patient is already scheduled for an exploratory  laparotomy at the time  of scan review.      I have personally reviewed the examination and initial interpretation  and I agree with the findings.    ANAND COMBS MD         SYSTEM ID:  [id number]          Medications Prior to Admission:     Medications Prior to Admission   Medication Sig Dispense Refill Last Dose    atorvastatin (LIPITOR) 10 MG tablet [ATORVASTATIN (LIPITOR) 10 MG TABLET] Take 10 mg by mouth bedtime.       ferrous sulfate (FEROSUL) 325 (65 Fe) MG tablet Take 325 mg by mouth       Ferrous Sulfate (IRON PO) Take by mouth.       glucosamine-chondroitin 500-400 mg cap [GLUCOSAMINE-CHONDROITIN 500-400 MG CAP] Take 2 capsules by mouth daily.       Methylcobalamin 1000 MCG SUBL Place 1,000 mcg under the tongue       neomycin-polymyxin-dexAMETHasone (MAXITROL) 3.5-78493-9.1 SUSP ophthalmic susp 1-2 drops every 4 hours       PARoxetine (PAXIL) 20 MG tablet [PAROXETINE (PAXIL) 20 MG TABLET] Take 20 mg by mouth bedtime.        [DISCONTINUED] acetaminophen (TYLENOL) 500 MG tablet Take 1,000 mg by mouth       [DISCONTINUED] docusate sodium (COLACE) 100 MG capsule Take 100 mg by mouth 2 times daily       [DISCONTINUED] ondansetron (ZOFRAN) 4 MG tablet Take 1 tablet (4 mg) by mouth every 6 hours At 8:00 am, 2:00 pm, 8:00 pm the day prior to your surgery with neomycin and flagyl. 3 tablet 0     [DISCONTINUED] polyethylene glycol (MIRALAX) 17 GM/Dose powder Please take 238 grams mixed with 64 oz of Gatorade at 4pm the night before surgery 238 g 0               Discharge Medications:     Current Discharge Medication List        START taking these medications    Details   methocarbamol (ROBAXIN) 500 MG tablet Take 1 tablet (500 mg) by mouth 4 times daily as needed for muscle spasms  Qty: 20 tablet, Refills: 0    Associated Diagnoses: Acute post-operative pain      oxyCODONE IR (ROXICODONE) 5 MG tablet Take 1 tablet (5 mg) by mouth every 6 hours as needed for severe pain  Qty: 18 tablet, Refills: 0    Associated  Diagnoses: Acute post-operative pain           CONTINUE these medications which have CHANGED    Details   acetaminophen (TYLENOL) 325 MG tablet Take 3 tablets (975 mg) by mouth every 8 hours  Qty: 75 tablet, Refills: 0    Associated Diagnoses: Acute post-operative pain      polyethylene glycol (MIRALAX) 17 GM/Dose powder Take 17 g by mouth daily as needed for other (If no bowel movement by Day 2 after discharge, start taking Miralax daily until return of normal stool habits. Then can be taken as needed for constipation.)  Qty: 510 g, Refills: 0    Associated Diagnoses: Malrotation of colon (H28)           CONTINUE these medications which have NOT CHANGED    Details   atorvastatin (LIPITOR) 10 MG tablet [ATORVASTATIN (LIPITOR) 10 MG TABLET] Take 10 mg by mouth bedtime.      !! ferrous sulfate (FEROSUL) 325 (65 Fe) MG tablet Take 325 mg by mouth      !! Ferrous Sulfate (IRON PO) Take by mouth.      glucosamine-chondroitin 500-400 mg cap [GLUCOSAMINE-CHONDROITIN 500-400 MG CAP] Take 2 capsules by mouth daily.      Methylcobalamin 1000 MCG SUBL Place 1,000 mcg under the tongue      neomycin-polymyxin-dexAMETHasone (MAXITROL) 3.5-05163-3.1 SUSP ophthalmic susp 1-2 drops every 4 hours      PARoxetine (PAXIL) 20 MG tablet [PAROXETINE (PAXIL) 20 MG TABLET] Take 20 mg by mouth bedtime.        !! - Potential duplicate medications found. Please discuss with provider.        STOP taking these medications       docusate sodium (COLACE) 100 MG capsule Comments:   Reason for Stopping:         ondansetron (ZOFRAN) 4 MG tablet Comments:   Reason for Stopping:                      Brief History of Illness:   Fany Bowman is a 68 year old female with a history notable for congenital malrotation of the colon, obesity and comorbidities including TANK, HLD, T2DM, NAFLD s/p robotic Manan-en-Y with antecolic, antigastric Manan limb in May 2023 who presented to the ED for acute abdominal pain. She describes acute onset vague, generalized,  constant, 8-10/10 abdominal pain that began 8 AM this morning.  She has had associated nausea and retching.  She reports passing a little bit of gas and had a nonbloody BM this morning.  She denies fevers.  She says this is similar to but worse than the abdominal pain she was admitted to [facility] with in December at which time she was found to have congenital intestinal malrotation with dilated cecum in the left upper quadrant. She was seen by Dr. Pandya in clinic on 2/14/2024 with plans for robotic right colectomy versus cecopexy in March.           Hospital Course:   Post-operatively pt was gently fluid resuscitated.  Gomez was removed and pt was able to void independently.  Pt had eventual return of bowel function and was able to tolerate small amounts of a low fiber diet.     Pt was seen by PT/OT and deemed appropriate for discharge home. Post-operative pain was controlled with scheduled tylenol, robaxin and prn oxycodone.     Patient is to follow up in the Colon and Rectal Surgery Clinic in 2-3 week with Brigitte De León NP or Yari Kaba PA-C and then with Dr. Kaminski in 2-3 weeks after.          Day of Discharge Physical Exam:   Blood pressure 112/56, pulse 68, temperature 97.6  F (36.4  C), temperature source Oral, resp. rate 18, weight 68.9 kg (151 lb 14.4 oz), SpO2 99%.    Gen: AAOx3, NAD  Pulm: Non-labored breathing  Abd: Soft, non-distended, appropriately tender, no guarding/rebound   Incision C/D/I with staples.  Steri-strip on the mid-aspect with small serosanginous drainage.  Ext:  Warm and well-perfused         Final Pathology Result:     Case Report   Surgical Pathology Report                         Case: [id number]                                   Authorizing Provider:  Telma Kaminski, Collected:           02/18/2024 01:15 PM                                  MD                                                                           Ordering Location:     [facility]  "[facility]     Received:            02/19/2024 08:59 AM                                  [facility]                                                   Pathologist:           Lake Hodgson DO                                                             Specimen:    Small Intestine, Terminal Ileum, Terminal Ileum and Right Colon                            Final Diagnosis   A. TERMINAL ILEUM AND RIGHT COLON:  - Benign small bowel and colon  - Reactive lymph nodes              Discharge Instructions and Follow-Up:     Discharge Procedure Orders   Reason for your hospital stay   Order Comments: Laparotomy exploratory, right hemicolectomy, adhesiolysis, closure of Leija defect, closure of ileocolic defect \"M-defect\", and closure of jejunojejunostomy defect.     Activity   Order Comments: ACTIVITY  -No lifting, pushing, pulling greater than 10 lbs and no strenuous exercise for 6 weeks   -Avoid straining, bearing down nor any valsalva maneuvers   -Do not insert anything into vagina, anus or rectum for at least 6 weeks or until discussed with Urology or Colorectal Surgery   -No driving while on narcotic analgesics (i.e. Percocet, oxycodone, Vicodin)  -No driving until you are able to fully twist to both sides or slam on brakes quickly and without any pain  -We encourage walking at least 4-5 times per day     Order Specific Question Answer Comments   Is discharge order? Yes      Wound care and dressings   Order Comments: WOUND CARE  -Inspect your wounds daily for signs of infection (increased redness, drainage, pain)  -Keep your wound clean and dry  -You may shower, but do not soak in tub or pool or scrub at the incision  -Staples are to be removed in clinic.     Other instructions to care for your wound at home:   Leakage from the incision is normal and can be controlled with plain dry gauze to the area. Recommend using a small piece of gauze secured with tegaderm or other easy to remove adhesive so as " not to disrupt the underlying steristrips. If your incision opens back up or you notice profuse leakage/fluid from your wound please call the clinic or be evaluated ASAP.     Adult [facility] Follow-up and recommended labs and tests   Order Comments: NOTIFY  Please contact Kennedi Zhang RN or Genie Bauer RN at [phone] for problems after discharge such as:  -Temperature > 101F, chills, rigors, dizziness  -Redness around wound, purulent or profuse drainage from wound  -Inability to tolerate diet, nausea or vomiting  -You stop passing gas, develop significant bloating, abdominal pain  -Have blood in stools/vomit  -Have severe diarrhea/constipation  -Any other questions or concerns.  - At nights (after 4:30pm), on weekends, or if urgent, call [phone] and ask the  to speak with the on-call Colorectal Surgery resident or fellow    Medication Instructions  Some of your medications may have changed. Please take only prescribed and resumed medications     FOLLOW-UP  1.  You will need to follow-up with Brigitte De León NP or Yari Kaba PA-C in the Colon and Rectal Surgery clinic in 2-3 week(s) and then with CRS Staff: Dr. Kaminski in 4-5 weeks after.  Please contact our Nurses (phone # [phone]) if you have not heard from our clinic in 3 business days afer discharge to schedule a follow-up appointment.    2. ALSO IMPORTANT: Follow up with your primary care provider in 1-2 weeks after discharge from the hospital to review this hospitalization.    Appointments on [place] and/or [facility] (with [facility] or [facility] provider or service). Call [phone] if you haven't heard regarding these appointments within 7 days of discharge.     Diet   Order Comments: DIET  -Regular Diet as tolerated  -We recommend eating slowly, chewing thoroughly, eating small frequent meals throughout the day  -Stay well hydrated.     Order Specific Question Answer Comments   Is discharge order? Yes             Home  Health Care:     Not needed           Discharge Disposition:     Discharged to home      Condition at discharge: Stable      Tiffany Santana PA-C ..................2/21/2024   7:38 AM  Colon and Rectal Surgery     Pt was seen and discussed with Dr. Rouse on 2/21/2024    The above plan of care was performed and communicated to me by Dr. Kaminski    I spent 30 minute face-to-face or coordinating care of Fany Bowman. Over 50% of our time on the unit was spent counseling the patient and/or coordinating care as documented in the assessment and plan.     [id number] post op hospital visit

## 2024-02-21 NOTE — PLAN OF CARE
Physical Therapy Discharge Summary    Reason for therapy discharge:    Discharged to home.    Progress towards therapy goal(s). See goals on Care Plan in [facility] electronic health record for goal details.  Goals not met.  Barriers to achieving goals:   discharge from facility.    Therapy recommendation(s):    Continued therapy is recommended.  Rationale/Recommendations:  Pt has improved mobility and decreased pain today. Pt needs reminders to follow abd precautions but otherwise IND. Anticipate safe d/c home w/ assist from  once medically ready..

## 2024-02-21 NOTE — PLAN OF CARE
Pt discharged to home. Discharge instructions discussed with Pt. PIV removed. Medications picked up at discharge pharmacy. All belongings with Pt.

## 2024-02-23 ENCOUNTER — PATIENT OUTREACH (OUTPATIENT)
Dept: CARE COORDINATION | Facility: CLINIC | Age: 69
End: 2024-02-23
Payer: COMMERCIAL

## 2024-02-23 ENCOUNTER — PATIENT OUTREACH (OUTPATIENT)
Dept: SURGERY | Facility: CLINIC | Age: 69
End: 2024-02-23
Payer: COMMERCIAL

## 2024-02-23 NOTE — PROGRESS NOTES
Connected Care Resource Center: [facility]    Background: Transitional Care Management program identified per system criteria and reviewed by [facility] Resource [place] team for possible outreach.    Assessment: Upon chart review, CCR Team member will not proceed with patient outreach related to this episode of Transitional Care Management program due to reason below:    Patient has active communication with a nurse, provider or care team for reason of post-hospital follow up plan.  Outreach call by CCRC team not indicated to minimize duplicative efforts.     Plan: Transitional Care Management episode addressed appropriately per reason noted above.      Chantal Andrews MA  Connected [facility] Resource [place], [facility]    *Connected Care Resource Team does NOT follow patient ongoing. Referrals are identified based on internal discharge reports and the outreach is to ensure patient has an understanding of their discharge instructions.

## 2024-02-23 NOTE — PROGRESS NOTES
"Post Op Note     Called to check on patient postoperatively after hospital discharge.       Patient is s/p Laparotomy exploratory, right hemicolectomy, adhesiolysis, closure of Leija defect, closure of ileocolic defect \"M-defect\", and closure of jejunojejunostomy defect with Dr. Telma Kaminski for cecal volvulus.   Admitted 2/18 and discharged on 2/21.    Pain is well controlled with tylenol, robaxin, oxycodone   Patient is eating and drinking normally. Patient is on a low fiber diet.  Encouraged patient to drink 8-10 glasses of water a day.   Patient is passing flats, is having soft stools.  Told her to start miralax if she does develop an constipation   Patient is voiding normally and urine is light in color.  Patient is set up with home care.   Patient Denies nausea and vomiting.  Patient Denies any fevers or chills.  Patient's incision is C/D/I. Patient reminded NOT to remove any dressings over their incisions.   Patient is on a activity restriction. Lifting 10 pounds for 6 weeks.   Patient Denies needing any forms completed.   Follow up is set up with Brigitte Rios NP on 3/6 and with Dr. Telma Kaminski on 4/10.   Encouraged the patient to contact the clinic in the meantime with any fevers, chills, nausea, vomiting, increased colostomy output, no colostomy output, dizziness, lightheadedness, uncontrolled pain, changes to the incisions, or with any questions or concerns.    Patient's questions were answered to their stated satisfaction and they are in agreement with this plan.    GENE Kolb [phone]  Colon & Rectal Surgery Clinic  [facility]    "

## 2024-02-27 NOTE — DISCHARGE SUMMARY
Please refer to Discharge Summary written by Tiffany Santana PA-C and Dr. Kaminski on 2/21/2024 for more details.     Discharge Diagnosis Addendum:   Acute large intestine ischemia

## 2024-03-02 ENCOUNTER — HEALTH MAINTENANCE LETTER (OUTPATIENT)
Age: 69
End: 2024-03-02

## 2024-03-05 ENCOUNTER — PRE VISIT (OUTPATIENT)
Dept: SURGERY | Facility: CLINIC | Age: 69
End: 2024-03-05

## 2024-03-06 ENCOUNTER — OFFICE VISIT (OUTPATIENT)
Dept: SURGERY | Facility: CLINIC | Age: 69
End: 2024-03-06
Payer: COMMERCIAL

## 2024-03-06 VITALS — DIASTOLIC BLOOD PRESSURE: 61 MMHG | SYSTOLIC BLOOD PRESSURE: 91 MMHG | OXYGEN SATURATION: 97 % | HEART RATE: 62 BPM

## 2024-03-06 DIAGNOSIS — Z09 FOLLOW-UP EXAMINATION AFTER COLORECTAL SURGERY: Primary | ICD-10-CM

## 2024-03-06 PROCEDURE — 99024 POSTOP FOLLOW-UP VISIT: CPT | Performed by: NURSE PRACTITIONER

## 2024-03-06 ASSESSMENT — PAIN SCALES - GENERAL: PAINLEVEL: NO PAIN (0)

## 2024-03-06 NOTE — PROGRESS NOTES
"Colon and Rectal Surgery Postoperative Clinic Note    RE: Fany Bowman  : 1955  [place]: 3/6/2024    Fany Bowman is a very pleasant 68 year old female with Acute abdomen, malrotation of intestine, previous gastric bypass, cecal volvulus with ischemia and internal hernia from adhesive band and constricted Leija defect who is now status post laparotomy exploratory, right hemicolectomy, adhesiolysis, closure of Leija defect, closure of ileocolic defect \"M-defect\", and closure of jejunojejunostomy defect on 24 with Dr. Kaminski.     Final Diagnosis   A. TERMINAL ILEUM AND RIGHT COLON:  - Benign small bowel and colon  - Reactive lymph nodes     Interval history: Ebonie has been doing well. No significant pain and not taking any pain medications. Tolerating a low residue diet. No nausea or vomiting. Having irregular bowel movements that are anywhere from constipated to loose.     Physical Examination:   BP 91/61 (BP Location: Left arm, Patient Position: Sitting, Cuff Size: Adult Regular)   Pulse 62   SpO2 97%   General: alert, oriented, in no acute distress, sitting comfortably  HEENT: mucous membranes moist  Abdomen: Midline incision well approximated with no erythema or drainage. Staples removed and steri strips placed.    Assessment/Plan:  68 year old female status post laparotomy exploratory, right hemicolectomy, adhesiolysis, closure of Leija defect, closure of ileocolic defect \"M-defect\", and closure of jejunojejunostomy defect on 24 with Dr. Kaminski. She is recovering well. Staples removed. No signs of infection. Advised a fiber supplement daily. Can start to slowly advance diet in 2 weeks. No lifting more than 10 pounds for a full 6 weeks from surgery. Scheduled for follow up with Dr. Kaminski on 4/10. Encouraged him to contact the clinic in the meantime with any questions or concerns. Patient's questions were answered to her stated satisfaction and she is in agreement with " this plan.     Medical history:  No past medical history on file.    Surgical history:  Past Surgical History:   Procedure Laterality Date    ARTHROSCOPY KNEE      COLECTOMY RIGHT Right 2/18/2024    Procedure: Colectomy right;  Surgeon: Telma Kaminski MD;  Location: [facility] OR    LAPAROTOMY EXPLORATORY N/A 2/18/2024    Procedure: Laparotomy exploratory;  Surgeon: Telma Kaminski MD;  Location: [facility] OR    OTHER SURGICAL HISTORY      hysteroscopy biopsy    OTHER SURGICAL HISTORY      IUD placement    RELEASE CARPAL TUNNEL      ZZC TOTAL KNEE ARTHROPLASTY Right 4/19/2016    Procedure: KNEE TOTAL ARTHROPLASTY, RIGHT;  Surgeon: Jairo Reyes MD;  Location: [facility];  Service: Orthopedics       Problem list:    Patient Active Problem List    Diagnosis Date Noted    Malrotation cecum (H28) 02/18/2024     Priority: Medium    Acute blood loss anemia 04/20/2016     Priority: Medium    Hypotension 04/20/2016     Priority: Medium    S/P total knee replacement using cement, right 04/19/2016     Priority: Medium    DM II (diabetes mellitus, type II), controlled (H) 04/19/2016     Priority: Medium    TANK (obstructive sleep apnea) 04/19/2016     Priority: Medium    Bradycardia 04/19/2016     Priority: Medium    Hyperlipidemia 04/19/2016     Priority: Medium       Medications:  Current Outpatient Medications   Medication Sig Dispense Refill    acetaminophen (TYLENOL) 325 MG tablet Take 3 tablets (975 mg) by mouth every 8 hours 75 tablet 0    atorvastatin (LIPITOR) 10 MG tablet [ATORVASTATIN (LIPITOR) 10 MG TABLET] Take 10 mg by mouth bedtime.      ferrous sulfate (FEROSUL) 325 (65 Fe) MG tablet Take 325 mg by mouth      Ferrous Sulfate (IRON PO) Take by mouth.      Methylcobalamin 1000 MCG SUBL Place 1,000 mcg under the tongue      neomycin-polymyxin-dexAMETHasone (MAXITROL) 3.5-35685-1.1 SUSP ophthalmic susp 1-2 drops every 4 hours      PARoxetine (PAXIL) 20 MG tablet [PAROXETINE (PAXIL) 20 MG TABLET] Take  20 mg by mouth bedtime.       polyethylene glycol (MIRALAX) 17 GM/Dose powder Take 17 g by mouth daily as needed for other (If no bowel movement by Day 2 after discharge, start taking Miralax daily until return of normal stool habits. Then can be taken as needed for constipation.) 510 g 0    glucosamine-chondroitin 500-400 mg cap [GLUCOSAMINE-CHONDROITIN 500-400 MG CAP] Take 2 capsules by mouth daily.      methocarbamol (ROBAXIN) 500 MG tablet Take 1 tablet (500 mg) by mouth 4 times daily as needed for muscle spasms (Patient not taking: Reported on 3/6/2024) 20 tablet 0    oxyCODONE IR (ROXICODONE) 5 MG tablet Take 1 tablet (5 mg) by mouth every 6 hours as needed for severe pain (Patient not taking: Reported on 3/6/2024) 18 tablet 0       Allergies:  No Known Allergies    Family history:  No family history on file.    Social history:  Social History     Tobacco Use    Smoking status: Never    Smokeless tobacco: Never   Substance Use Topics    Alcohol use: Yes     Comment: Alcoholic Drinks/day: 1 wk     Marital status: .  Occupation: retired.    Nursing Notes:   Theodore Hernandez, EMT  3/6/2024  8:16 AM  Signed  Chief Complaint   Patient presents with    Post-op Visit       Vitals:    03/06/24 0814   BP: 91/61   BP Location: Left arm   Patient Position: Sitting   Cuff Size: Adult Regular   Pulse: 62   SpO2: 97%       There is no height or weight on file to calculate BMI.    Theodore Hernandez EMT-P       15 minutes spent on the date of the encounter doing chart review, history and exam, documentation and further activities as noted above.   This is a postop visit.    LEONILA Franklin, NP-C  Colon and Rectal Surgery  [facility]    This note was created using speech recognition software and may contain unintended word substitutions.

## 2024-03-06 NOTE — NURSING NOTE
Chief Complaint   Patient presents with    Post-op Visit       Vitals:    03/06/24 0814   BP: 91/61   BP Location: Left arm   Patient Position: Sitting   Cuff Size: Adult Regular   Pulse: 62   SpO2: 97%       There is no height or weight on file to calculate BMI.    Theodore Hernandez EMT-P

## 2024-03-06 NOTE — LETTER
"3/6/2024       RE: Fany Bowman  [address] [facility] [address]       Dear Colleague,    Thank you for referring your patient, Fany Bowman, to the [facility] COLON AND RECTAL SURGERY CLINIC [place] [facility]. Please see a copy of my visit note below.    Colon and Rectal Surgery Postoperative Clinic Note    RE: Fany Bowman  : 1955  [place]: 3/6/2024    Fany Bowman is a very pleasant 68 year old female with Acute abdomen, malrotation of intestine, previous gastric bypass, cecal volvulus with ischemia and internal hernia from adhesive band and constricted Leija defect who is now status post laparotomy exploratory, right hemicolectomy, adhesiolysis, closure of Leija defect, closure of ileocolic defect \"M-defect\", and closure of jejunojejunostomy defect on 24 with Dr. Kaminski.     Final Diagnosis   A. TERMINAL ILEUM AND RIGHT COLON:  - Benign small bowel and colon  - Reactive lymph nodes     Interval history: Ebonie has been doing well. No significant pain and not taking any pain medications. Tolerating a low residue diet. No nausea or vomiting. Having irregular bowel movements that are anywhere from constipated to loose.     Physical Examination:   BP 91/61 (BP Location: Left arm, Patient Position: Sitting, Cuff Size: Adult Regular)   Pulse 62   SpO2 97%   General: alert, oriented, in no acute distress, sitting comfortably  HEENT: mucous membranes moist  Abdomen: Midline incision well approximated with no erythema or drainage. Staples removed and steri strips placed.    Assessment/Plan:  68 year old female status post laparotomy exploratory, right hemicolectomy, adhesiolysis, closure of Leija defect, closure of ileocolic defect \"M-defect\", and closure of jejunojejunostomy defect on 24 with Dr. Kaminski. She is recovering well. Staples removed. No signs of infection. Advised a fiber supplement daily. Can start to slowly " advance diet in 2 weeks. No lifting more than 10 pounds for a full 6 weeks from surgery. Scheduled for follow up with Dr. Kaminski on 4/10. Encouraged him to contact the clinic in the meantime with any questions or concerns. Patient's questions were answered to her stated satisfaction and she is in agreement with this plan.     Medical history:  No past medical history on file.    Surgical history:  Past Surgical History:   Procedure Laterality Date    ARTHROSCOPY KNEE      COLECTOMY RIGHT Right 2/18/2024    Procedure: Colectomy right;  Surgeon: Telma Kaminski MD;  Location: [facility] OR    LAPAROTOMY EXPLORATORY N/A 2/18/2024    Procedure: Laparotomy exploratory;  Surgeon: Telma Kaminski MD;  Location: [facility] OR    OTHER SURGICAL HISTORY      hysteroscopy biopsy    OTHER SURGICAL HISTORY      IUD placement    RELEASE CARPAL TUNNEL      ZZC TOTAL KNEE ARTHROPLASTY Right 4/19/2016    Procedure: KNEE TOTAL ARTHROPLASTY, RIGHT;  Surgeon: Jairo Reyes MD;  Location: [facility];  Service: Orthopedics       Problem list:    Patient Active Problem List    Diagnosis Date Noted    Malrotation cecum (H28) 02/18/2024     Priority: Medium    Acute blood loss anemia 04/20/2016     Priority: Medium    Hypotension 04/20/2016     Priority: Medium    S/P total knee replacement using cement, right 04/19/2016     Priority: Medium    DM II (diabetes mellitus, type II), controlled (H) 04/19/2016     Priority: Medium    TANK (obstructive sleep apnea) 04/19/2016     Priority: Medium    Bradycardia 04/19/2016     Priority: Medium    Hyperlipidemia 04/19/2016     Priority: Medium       Medications:  Current Outpatient Medications   Medication Sig Dispense Refill    acetaminophen (TYLENOL) 325 MG tablet Take 3 tablets (975 mg) by mouth every 8 hours 75 tablet 0    atorvastatin (LIPITOR) 10 MG tablet [ATORVASTATIN (LIPITOR) 10 MG TABLET] Take 10 mg by mouth bedtime.      ferrous sulfate (FEROSUL) 325 (65 Fe) MG tablet  Take 325 mg by mouth      Ferrous Sulfate (IRON PO) Take by mouth.      Methylcobalamin 1000 MCG SUBL Place 1,000 mcg under the tongue      neomycin-polymyxin-dexAMETHasone (MAXITROL) 3.5-17883-8.1 SUSP ophthalmic susp 1-2 drops every 4 hours      PARoxetine (PAXIL) 20 MG tablet [PAROXETINE (PAXIL) 20 MG TABLET] Take 20 mg by mouth bedtime.       polyethylene glycol (MIRALAX) 17 GM/Dose powder Take 17 g by mouth daily as needed for other (If no bowel movement by Day 2 after discharge, start taking Miralax daily until return of normal stool habits. Then can be taken as needed for constipation.) 510 g 0    glucosamine-chondroitin 500-400 mg cap [GLUCOSAMINE-CHONDROITIN 500-400 MG CAP] Take 2 capsules by mouth daily.      methocarbamol (ROBAXIN) 500 MG tablet Take 1 tablet (500 mg) by mouth 4 times daily as needed for muscle spasms (Patient not taking: Reported on 3/6/2024) 20 tablet 0    oxyCODONE IR (ROXICODONE) 5 MG tablet Take 1 tablet (5 mg) by mouth every 6 hours as needed for severe pain (Patient not taking: Reported on 3/6/2024) 18 tablet 0       Allergies:  No Known Allergies    Family history:  No family history on file.    Social history:  Social History     Tobacco Use    Smoking status: Never    Smokeless tobacco: Never   Substance Use Topics    Alcohol use: Yes     Comment: Alcoholic Drinks/day: 1 wk     Marital status: .  Occupation: retired.    Nursing Notes:   Theodore Hernandez EMT  3/6/2024  8:16 AM  Signed  Chief Complaint   Patient presents with    Post-op Visit       Vitals:    03/06/24 0814   BP: 91/61   BP Location: Left arm   Patient Position: Sitting   Cuff Size: Adult Regular   Pulse: 62   SpO2: 97%       There is no height or weight on file to calculate BMI.    Theodore Hernandez EMT-P    15 minutes spent on the date of the encounter doing chart review, history and exam, documentation and further activities as noted above.   This is a postop visit.    This note was created using speech  recognition software and may contain unintended word substitutions.      Again, thank you for allowing me to participate in the care of your patient.      Sincerely,    LEONILA Monzon CNP

## 2024-04-03 ENCOUNTER — TELEPHONE (OUTPATIENT)
Dept: SURGERY | Facility: CLINIC | Age: 69
End: 2024-04-03
Payer: COMMERCIAL

## 2024-04-03 NOTE — PROGRESS NOTES
"Colon and Rectal Surgery Postoperative Video Note    RE: Fany Bowman  : 1955  [place]: 4/10/2024    Fany Bowman is a 68 year old female who is being evaluated via a billable video visit.      Patient has given verbal consent for Video visit? Yes    Video Start Time:  3:22PM      Fany Bowman is a very pleasant 68 year old female with a history of cecal malrotation incidentally noted during robotic Manan-en-Y gastric bypass in 2023, now almost 8 weeks status post emergent exploratory laparotomy with adhesiolysis, right hemicolectomy, closure of Leija defect, closure of ileocolic defect (\"M defect\"), and closure of jejunojejunostomy defect.     Final Diagnosis   A. TERMINAL ILEUM AND RIGHT COLON:  - Benign small bowel and colon  - Reactive lymph nodes     Interval history: Feeling very well. Good energy. Lost some weight but has gained some back and is eating well. Some diarrhea. She is cognizant of what she eats and this helps with the diarrhea. No antidiarrheals    Last colonoscopy was 2020 and this was normal.       PLEASE SEE NOTE BELOW FOR PHYSICAL EXAMINATION, REVIEW OF SYSTEMS, AND OTHER HISTORY.    Assessment/Plan:  68 year old female with a history of cecal malrotation incidentally noted during robotic Manan-en-Y gastric bypass in 2023, now almost 8 weeks status post emergent exploratory laparotomy with adhesiolysis, right hemicolectomy, closure of Leija defect, closure of ileocolic defect (\"M defect\"), and closure of jejunojejunostomy defect. Doing well after surgery. May liberalize diet. No further activity restrictions. Recommend repeat colonoscopy in . Follow up as needed. Patient's questions were answered to her stated satisfaction and she is in agreement with this plan. Also recommended talking to a dietician around her diarrhea and having a more elemental diet with her anatomy and also tracking weight which has overall stabilized.      Video-Visit Details    Type of service:  " Video Visit    Video End Time (time video stopped): 3:40PM    Originating Location (pt. Location): Home    Distant Location (provider location):  [facility] COLON AND RECTAL SURGERY CLINIC [place]     Mode of Communication:  Video Conference via [employer]    15 minutes spent on the date of the encounter doing chart review, history, review of imaging, documentation, and further activities as noted above, which includes my time spent on video with the patient/family. This is a postoperative visit.      Medical history:  No past medical history on file.    Surgical history:  Past Surgical History:   Procedure Laterality Date    ARTHROSCOPY KNEE      COLECTOMY RIGHT Right 2/18/2024    Procedure: Colectomy right;  Surgeon: Telma Kaminski MD;  Location: [facility] OR    LAPAROTOMY EXPLORATORY N/A 2/18/2024    Procedure: Laparotomy exploratory;  Surgeon: Telma Kaminski MD;  Location: [facility] OR    OTHER SURGICAL HISTORY      hysteroscopy biopsy    OTHER SURGICAL HISTORY      IUD placement    RELEASE CARPAL TUNNEL      ZZC TOTAL KNEE ARTHROPLASTY Right 4/19/2016    Procedure: KNEE TOTAL ARTHROPLASTY, RIGHT;  Surgeon: Jairo Reyes MD;  Location: [facility];  Service: Orthopedics       Problem list:    Patient Active Problem List    Diagnosis Date Noted    Malrotation cecum (H28) 02/18/2024     Priority: Medium    Acute blood loss anemia 04/20/2016     Priority: Medium    Hypotension 04/20/2016     Priority: Medium    S/P total knee replacement using cement, right 04/19/2016     Priority: Medium    DM II (diabetes mellitus, type II), controlled (H) 04/19/2016     Priority: Medium    TANK (obstructive sleep apnea) 04/19/2016     Priority: Medium    Bradycardia 04/19/2016     Priority: Medium    Hyperlipidemia 04/19/2016     Priority: Medium       Medications:  Current Outpatient Medications   Medication Sig Dispense Refill    acetaminophen (TYLENOL) 325 MG tablet Take 3 tablets (975 mg) by mouth every  8 hours 75 tablet 0    atorvastatin (LIPITOR) 10 MG tablet [ATORVASTATIN (LIPITOR) 10 MG TABLET] Take 10 mg by mouth bedtime.      ferrous sulfate (FEROSUL) 325 (65 Fe) MG tablet Take 325 mg by mouth      Ferrous Sulfate (IRON PO) Take by mouth.      Methylcobalamin 1000 MCG SUBL Place 1,000 mcg under the tongue      neomycin-polymyxin-dexAMETHasone (MAXITROL) 3.5-32524-5.1 SUSP ophthalmic susp 1-2 drops every 4 hours      PARoxetine (PAXIL) 20 MG tablet [PAROXETINE (PAXIL) 20 MG TABLET] Take 20 mg by mouth bedtime.       polyethylene glycol (MIRALAX) 17 GM/Dose powder Take 17 g by mouth daily as needed for other (If no bowel movement by Day 2 after discharge, start taking Miralax daily until return of normal stool habits. Then can be taken as needed for constipation.) 510 g 0    glucosamine-chondroitin 500-400 mg cap [GLUCOSAMINE-CHONDROITIN 500-400 MG CAP] Take 2 capsules by mouth daily.      methocarbamol (ROBAXIN) 500 MG tablet Take 1 tablet (500 mg) by mouth 4 times daily as needed for muscle spasms (Patient not taking: Reported on 3/6/2024) 20 tablet 0    oxyCODONE IR (ROXICODONE) 5 MG tablet Take 1 tablet (5 mg) by mouth every 6 hours as needed for severe pain (Patient not taking: Reported on 3/6/2024) 18 tablet 0       Allergies:  No Known Allergies    Family history:  No family history on file.    Social history:  Social History     Tobacco Use    Smoking status: Never    Smokeless tobacco: Never   Substance Use Topics    Alcohol use: Yes     Comment: Alcoholic Drinks/day: 1 wk    Marital status: .    Nursing Notes:   Theodore Hernandez, EMT  4/10/2024  3:03 PM  Signed  Chief Complaint   Patient presents with    Post-op Visit       There were no vitals filed for this visit.    There is no height or weight on file to calculate BMI.    Theodore Hernandez EMT-P         Telma Kaminski MD  Colon and Rectal Surgery Staff  [facility]      This note was created using speech  recognition software and may contain unintended word substitutions.

## 2024-04-03 NOTE — TELEPHONE ENCOUNTER
Patient confirmed scheduled appointment:  Date: 4/10/24  Time: 4:30 pm  Visit type: Post-op  Provider: Dr. Kaminski  Location: [facility], appt will be virtual  Testing/imaging: n/a  Additional notes: Changed to video per pt's request. Double checked with Kennedi CASAS RN, ok to do video.

## 2024-04-10 ENCOUNTER — VIRTUAL VISIT (OUTPATIENT)
Dept: SURGERY | Facility: CLINIC | Age: 69
End: 2024-04-10
Payer: COMMERCIAL

## 2024-04-10 DIAGNOSIS — Z09 FOLLOW-UP EXAMINATION AFTER COLORECTAL SURGERY: Primary | ICD-10-CM

## 2024-04-10 PROCEDURE — 99024 POSTOP FOLLOW-UP VISIT: CPT | Mod: 95 | Performed by: COLON & RECTAL SURGERY

## 2024-04-10 ASSESSMENT — PAIN SCALES - GENERAL: PAINLEVEL: NO PAIN (0)

## 2024-04-10 NOTE — NURSING NOTE
Chief Complaint   Patient presents with    Post-op Visit       There were no vitals filed for this visit.    There is no height or weight on file to calculate BMI.    Theodore Hernnadez EMT-P

## 2024-04-10 NOTE — LETTER
"4/10/2024       RE: Fany Bowman  [address] [facility] [address]     Dear Colleague,    Thank you for referring your patient, Fany Bowman, to the [facility] COLON AND RECTAL SURGERY CLINIC [place] at [facility]. Please see a copy of my visit note below.    Colon and Rectal Surgery Postoperative Video Note    RE: Fany Bowman  : 1955  [place]: 4/10/2024    Fany Bowman is a 68 year old female who is being evaluated via a billable video visit.      Patient has given verbal consent for Video visit? Yes    Video Start Time:  3:22PM      Fany Bowman is a very pleasant 68 year old female with a history of cecal malrotation incidentally noted during robotic Manan-en-Y gastric bypass in 2023, now almost 8 weeks status post emergent exploratory laparotomy with adhesiolysis, right hemicolectomy, closure of Leija defect, closure of ileocolic defect (\"M defect\"), and closure of jejunojejunostomy defect.     Final Diagnosis   A. TERMINAL ILEUM AND RIGHT COLON:  - Benign small bowel and colon  - Reactive lymph nodes     Interval history: Feeling very well. Good energy. Lost some weight but has gained some back and is eating well. Some diarrhea. She is cognizant of what she eats and this helps with the diarrhea. No antidiarrheals    Last colonoscopy was 2020 and this was normal.       PLEASE SEE NOTE BELOW FOR PHYSICAL EXAMINATION, REVIEW OF SYSTEMS, AND OTHER HISTORY.    Assessment/Plan:  68 year old female with a history of cecal malrotation incidentally noted during robotic Manan-en-Y gastric bypass in 2023, now almost 8 weeks status post emergent exploratory laparotomy with adhesiolysis, right hemicolectomy, closure of Leija defect, closure of ileocolic defect (\"M defect\"), and closure of jejunojejunostomy defect. Doing well after surgery. May liberalize diet. No further activity restrictions. Recommend repeat colonoscopy in . Follow up as " needed. Patient's questions were answered to her stated satisfaction and she is in agreement with this plan. Also recommended talking to a dietician around her diarrhea and having a more elemental diet with her anatomy and also tracking weight which has overall stabilized.      Video-Visit Details    Type of service:  Video Visit    Video End Time (time video stopped): 3:40PM    Originating Location (pt. Location): Home    Distant Location (provider location):  [facility] COLON AND RECTAL SURGERY CLINIC [place]     Mode of Communication:  Video Conference via [employer]    15 minutes spent on the date of the encounter doing chart review, history, review of imaging, documentation, and further activities as noted above, which includes my time spent on video with the patient/family. This is a postoperative visit.      Medical history:  No past medical history on file.    Surgical history:  Past Surgical History:   Procedure Laterality Date    ARTHROSCOPY KNEE      COLECTOMY RIGHT Right 2/18/2024    Procedure: Colectomy right;  Surgeon: Telma Kaminski MD;  Location: [facility] OR    LAPAROTOMY EXPLORATORY N/A 2/18/2024    Procedure: Laparotomy exploratory;  Surgeon: Telma Kaminski MD;  Location: [facility] OR    OTHER SURGICAL HISTORY      hysteroscopy biopsy    OTHER SURGICAL HISTORY      IUD placement    RELEASE CARPAL TUNNEL      ZZC TOTAL KNEE ARTHROPLASTY Right 4/19/2016    Procedure: KNEE TOTAL ARTHROPLASTY, RIGHT;  Surgeon: Jairo Reyes MD;  Location: [facility];  Service: Orthopedics       Problem list:    Patient Active Problem List    Diagnosis Date Noted    Malrotation cecum (H28) 02/18/2024     Priority: Medium    Acute blood loss anemia 04/20/2016     Priority: Medium    Hypotension 04/20/2016     Priority: Medium    S/P total knee replacement using cement, right 04/19/2016     Priority: Medium    DM II (diabetes mellitus, type II), controlled (H) 04/19/2016     Priority: Medium     TANK (obstructive sleep apnea) 04/19/2016     Priority: Medium    Bradycardia 04/19/2016     Priority: Medium    Hyperlipidemia 04/19/2016     Priority: Medium       Medications:  Current Outpatient Medications   Medication Sig Dispense Refill    acetaminophen (TYLENOL) 325 MG tablet Take 3 tablets (975 mg) by mouth every 8 hours 75 tablet 0    atorvastatin (LIPITOR) 10 MG tablet [ATORVASTATIN (LIPITOR) 10 MG TABLET] Take 10 mg by mouth bedtime.      ferrous sulfate (FEROSUL) 325 (65 Fe) MG tablet Take 325 mg by mouth      Ferrous Sulfate (IRON PO) Take by mouth.      Methylcobalamin 1000 MCG SUBL Place 1,000 mcg under the tongue      neomycin-polymyxin-dexAMETHasone (MAXITROL) 3.5-80274-7.1 SUSP ophthalmic susp 1-2 drops every 4 hours      PARoxetine (PAXIL) 20 MG tablet [PAROXETINE (PAXIL) 20 MG TABLET] Take 20 mg by mouth bedtime.       polyethylene glycol (MIRALAX) 17 GM/Dose powder Take 17 g by mouth daily as needed for other (If no bowel movement by Day 2 after discharge, start taking Miralax daily until return of normal stool habits. Then can be taken as needed for constipation.) 510 g 0    glucosamine-chondroitin 500-400 mg cap [GLUCOSAMINE-CHONDROITIN 500-400 MG CAP] Take 2 capsules by mouth daily.      methocarbamol (ROBAXIN) 500 MG tablet Take 1 tablet (500 mg) by mouth 4 times daily as needed for muscle spasms (Patient not taking: Reported on 3/6/2024) 20 tablet 0    oxyCODONE IR (ROXICODONE) 5 MG tablet Take 1 tablet (5 mg) by mouth every 6 hours as needed for severe pain (Patient not taking: Reported on 3/6/2024) 18 tablet 0       Allergies:  No Known Allergies    Family history:  No family history on file.    Social history:  Social History     Tobacco Use    Smoking status: Never    Smokeless tobacco: Never   Substance Use Topics    Alcohol use: Yes     Comment: Alcoholic Drinks/day: 1 wk    Marital status: .    Nursing Notes:   Theodore Hernandez, EMT  4/10/2024  3:03 PM  Signed  Chief Complaint    Patient presents with    Post-op Visit       There were no vitals filed for this visit.    There is no height or weight on file to calculate BMI.    Theodore Hernandez EMT-P       This note was created using speech recognition software and may contain unintended word substitutions.          Again, thank you for allowing me to participate in the care of your patient.      Sincerely,    Telma Kaminski MD

## 2024-09-28 ENCOUNTER — HEALTH MAINTENANCE LETTER (OUTPATIENT)
Age: 69
End: 2024-09-28

## 2025-01-18 ENCOUNTER — HEALTH MAINTENANCE LETTER (OUTPATIENT)
Age: 70
End: 2025-01-18

## 2025-04-05 ENCOUNTER — HEALTH MAINTENANCE LETTER (OUTPATIENT)
Age: 70
End: 2025-04-05

## (undated) DEVICE — STPL LINEAR CUT 75MM TLC75

## (undated) DEVICE — SU VICRYL 2-0 CTX 36" J369H

## (undated) DEVICE — SU VICRYL 2-0 TIE 54" J615H

## (undated) DEVICE — LINEN TOWEL PACK X6 WHITE 5487

## (undated) DEVICE — JELLY LUBRICATING SURGILUBE 2OZ TUBE

## (undated) DEVICE — LINEN TOWEL PACK X30 5481

## (undated) DEVICE — SU VICRYL 3-0 SH 27" J316H

## (undated) DEVICE — SU VICRYL 3-0 SH CR 8X18" J774

## (undated) DEVICE — PREP CHLORAPREP 26ML TINTED HI-LITE ORANGE 930815

## (undated) DEVICE — SUCTION MANIFOLD NEPTUNE 2 SYS 4 PORT 0702-020-000

## (undated) DEVICE — SU VICRYL 2-0 CT-2 27" UND J269H

## (undated) DEVICE — Device

## (undated) DEVICE — DRAPE IOBAN INCISE 23X17" 6650EZ

## (undated) DEVICE — SU SILK 3-0 TIE 12X30" A304H

## (undated) DEVICE — SU PDS II 1 TP-1 48" Z880G

## (undated) DEVICE — STPL LINEAR 90 X 3.5MM TA9035S

## (undated) DEVICE — ESU LIGASURE IMPACT OPEN SEALER/DVDR CVD LG JAW LF4418

## (undated) DEVICE — STPL RELOAD LINEAR CUT 75MM TCR75

## (undated) DEVICE — GLOVE BIOGEL PI ULTRATOUCH SZ 6.5 41165

## (undated) DEVICE — SU MONOCRYL 4-0 PS-2 27" UND Y426H

## (undated) DEVICE — ESU ELEC BLADE 6" COATED E1450-6

## (undated) DEVICE — PACK AB HYST II

## (undated) DEVICE — DRAPE SHEET REV FOLD 3/4 9349

## (undated) RX ORDER — SODIUM CHLORIDE, SODIUM LACTATE, POTASSIUM CHLORIDE, CALCIUM CHLORIDE 600; 310; 30; 20 MG/100ML; MG/100ML; MG/100ML; MG/100ML
INJECTION, SOLUTION INTRAVENOUS
Status: DISPENSED
Start: 2024-02-18

## (undated) RX ORDER — FENTANYL CITRATE 50 UG/ML
INJECTION, SOLUTION INTRAMUSCULAR; INTRAVENOUS
Status: DISPENSED
Start: 2024-02-18

## (undated) RX ORDER — HYDROMORPHONE HCL IN WATER/PF 6 MG/30 ML
PATIENT CONTROLLED ANALGESIA SYRINGE INTRAVENOUS
Status: DISPENSED
Start: 2024-02-18

## (undated) RX ORDER — FENTANYL CITRATE-0.9 % NACL/PF 10 MCG/ML
PLASTIC BAG, INJECTION (ML) INTRAVENOUS
Status: DISPENSED
Start: 2024-02-18

## (undated) RX ORDER — ATROPINE SULFATE 0.4 MG/ML
AMPUL (ML) INJECTION
Status: DISPENSED
Start: 2024-02-18

## (undated) RX ORDER — MAGNESIUM SULFATE HEPTAHYDRATE 40 MG/ML
INJECTION, SOLUTION INTRAVENOUS
Status: DISPENSED
Start: 2024-02-18

## (undated) RX ORDER — DEXAMETHASONE SODIUM PHOSPHATE 4 MG/ML
INJECTION, SOLUTION INTRA-ARTICULAR; INTRALESIONAL; INTRAMUSCULAR; INTRAVENOUS; SOFT TISSUE
Status: DISPENSED
Start: 2024-02-18

## (undated) RX ORDER — HYDROMORPHONE HYDROCHLORIDE 1 MG/ML
INJECTION, SOLUTION INTRAMUSCULAR; INTRAVENOUS; SUBCUTANEOUS
Status: DISPENSED
Start: 2024-02-18

## (undated) RX ORDER — PROPOFOL 10 MG/ML
INJECTION, EMULSION INTRAVENOUS
Status: DISPENSED
Start: 2024-02-18

## (undated) RX ORDER — ONDANSETRON 2 MG/ML
INJECTION INTRAMUSCULAR; INTRAVENOUS
Status: DISPENSED
Start: 2024-02-18